# Patient Record
Sex: FEMALE | Race: WHITE | NOT HISPANIC OR LATINO | Employment: OTHER | URBAN - NONMETROPOLITAN AREA
[De-identification: names, ages, dates, MRNs, and addresses within clinical notes are randomized per-mention and may not be internally consistent; named-entity substitution may affect disease eponyms.]

---

## 2017-03-21 ENCOUNTER — OFFICE VISIT (OUTPATIENT)
Dept: ENDOCRINOLOGY | Facility: CLINIC | Age: 77
End: 2017-03-21

## 2017-03-21 VITALS
BODY MASS INDEX: 32.62 KG/M2 | HEIGHT: 67 IN | HEART RATE: 67 BPM | WEIGHT: 207.8 LBS | DIASTOLIC BLOOD PRESSURE: 68 MMHG | SYSTOLIC BLOOD PRESSURE: 120 MMHG

## 2017-03-21 DIAGNOSIS — E55.9 VITAMIN D DEFICIENCY: ICD-10-CM

## 2017-03-21 DIAGNOSIS — E53.8 B12 DEFICIENCY: ICD-10-CM

## 2017-03-21 DIAGNOSIS — E78.2 MIXED DIABETIC HYPERLIPIDEMIA ASSOCIATED WITH TYPE 2 DIABETES MELLITUS (HCC): ICD-10-CM

## 2017-03-21 DIAGNOSIS — E11.59 HYPERTENSION ASSOCIATED WITH DIABETES (HCC): ICD-10-CM

## 2017-03-21 DIAGNOSIS — E11.69 MIXED DIABETIC HYPERLIPIDEMIA ASSOCIATED WITH TYPE 2 DIABETES MELLITUS (HCC): ICD-10-CM

## 2017-03-21 DIAGNOSIS — I15.2 HYPERTENSION ASSOCIATED WITH DIABETES (HCC): ICD-10-CM

## 2017-03-21 DIAGNOSIS — Z79.4 TYPE 2 DIABETES MELLITUS WITH DIABETIC POLYNEUROPATHY, WITH LONG-TERM CURRENT USE OF INSULIN (HCC): Primary | ICD-10-CM

## 2017-03-21 DIAGNOSIS — E11.42 TYPE 2 DIABETES MELLITUS WITH DIABETIC POLYNEUROPATHY, WITH LONG-TERM CURRENT USE OF INSULIN (HCC): Primary | ICD-10-CM

## 2017-03-21 LAB — GLUCOSE BLDC GLUCOMTR-MCNC: 110 MG/DL (ref 70–130)

## 2017-03-21 PROCEDURE — 99204 OFFICE O/P NEW MOD 45 MIN: CPT | Performed by: INTERNAL MEDICINE

## 2017-03-21 PROCEDURE — 82962 GLUCOSE BLOOD TEST: CPT | Performed by: INTERNAL MEDICINE

## 2017-03-21 RX ORDER — MONTELUKAST SODIUM 10 MG/1
10 TABLET ORAL NIGHTLY
COMMUNITY

## 2017-03-21 RX ORDER — SIMVASTATIN 20 MG
20 TABLET ORAL NIGHTLY
COMMUNITY

## 2017-03-21 RX ORDER — LISINOPRIL 20 MG/1
20 TABLET ORAL DAILY
COMMUNITY

## 2017-03-21 RX ORDER — CHOLECALCIFEROL (VITAMIN D3) 125 MCG
5 CAPSULE ORAL
Status: ON HOLD | COMMUNITY
End: 2018-10-08

## 2017-03-21 RX ORDER — LISINOPRIL 10 MG/1
10 TABLET ORAL DAILY
COMMUNITY
End: 2018-10-10 | Stop reason: HOSPADM

## 2017-03-21 RX ORDER — AMLODIPINE BESYLATE 5 MG/1
5 TABLET ORAL DAILY
COMMUNITY

## 2017-03-21 RX ORDER — MEMANTINE HYDROCHLORIDE 10 MG/1
10 TABLET ORAL 2 TIMES DAILY
COMMUNITY

## 2017-03-21 NOTE — PROGRESS NOTES
Kym Fraser is a 76 y.o. female who presents for  evaluation of   Chief Complaint   Patient presents with   • Diabetes       Referring provider    Primary Care Provider    Brendan Longoria MD    Nursing Home Resident  Comes for diaabetes management     Diabetes     Duration 10 years    Timing - Diabetes is Constant    Quality -  needs improvement    Severity -  severe    Complications - peripheral neuropathy and cerebrovascular disease    Current symptoms/problems  paresthesia of the feet, polydipsia and polyuria     Alleviating Factors: nursing home provides insulin regimen    Aggravating Factors : high carb intake    Side Effects  none    Current diet  high carb    Current exercise none    Current monitoring regimen: home blood tests - 4 times daily  Home blood sugar records: fasting range: , postprandial range: 100-400    Hypoglycemia nocturnal    No past medical history on file.  No family history on file.  Social History   Substance Use Topics   • Smoking status: Unknown If Ever Smoked   • Smokeless tobacco: Not on file   • Alcohol use Not on file         Current Outpatient Prescriptions:   •  amLODIPine (NORVASC) 5 MG tablet, Take 5 mg by mouth Daily., Disp: , Rfl:   •  lisinopril (PRINIVIL,ZESTRIL) 10 MG tablet, Take 10 mg by mouth Daily., Disp: , Rfl:   •  lisinopril (PRINIVIL,ZESTRIL) 20 MG tablet, Take 20 mg by mouth Daily., Disp: , Rfl:   •  melatonin 5 MG tablet tablet, Take 5 mg by mouth., Disp: , Rfl:   •  memantine (NAMENDA) 10 MG tablet, Take 10 mg by mouth 2 (Two) Times a Day., Disp: , Rfl:   •  montelukast (SINGULAIR) 10 MG tablet, Take 10 mg by mouth Every Night., Disp: , Rfl:   •  simvastatin (ZOCOR) 20 MG tablet, Take 20 mg by mouth Every Night., Disp: , Rfl:     Review of Systems    Review of Systems   Constitutional: Positive for fatigue. Negative for activity change, appetite change, chills, diaphoresis, fever and unexpected weight change.   HENT: Negative for congestion, dental  "problem, drooling, ear discharge, ear pain, facial swelling, mouth sores, postnasal drip, rhinorrhea, sinus pressure, sore throat, tinnitus, trouble swallowing and voice change.    Eyes: Negative for photophobia, pain, discharge, redness, itching and visual disturbance.   Respiratory: Negative for apnea, cough, choking, chest tightness, shortness of breath, wheezing and stridor.    Cardiovascular: Negative for chest pain, palpitations and leg swelling.   Gastrointestinal: Negative for abdominal distention, abdominal pain, constipation, diarrhea, nausea and vomiting.   Endocrine: Positive for polyphagia. Negative for cold intolerance, heat intolerance, polydipsia and polyuria.   Genitourinary: Positive for frequency. Negative for decreased urine volume, difficulty urinating, dysuria, flank pain, hematuria and urgency.   Musculoskeletal: Negative for arthralgias, back pain, gait problem, joint swelling, myalgias, neck pain and neck stiffness.   Skin: Negative for color change, pallor, rash and wound.   Allergic/Immunologic: Negative for immunocompromised state.   Neurological: Negative for dizziness, tremors, seizures, syncope, facial asymmetry, speech difficulty, weakness, light-headedness, numbness and headaches.   Hematological: Negative for adenopathy.   Psychiatric/Behavioral: Negative for agitation, behavioral problems, confusion, decreased concentration, dysphoric mood, hallucinations, self-injury, sleep disturbance and suicidal ideas. The patient is not nervous/anxious and is not hyperactive.         Objective:     /68 (BP Location: Right arm, Patient Position: Sitting, Cuff Size: Adult)  Pulse 67  Ht 67\" (170.2 cm)  Wt 207 lb 12.8 oz (94.3 kg)  BMI 32.55 kg/m2    Physical Exam   Constitutional: She appears well-developed.   HENT:   Head: Normocephalic.   Right Ear: External ear normal.   Left Ear: External ear normal.   Nose: Nose normal.   Eyes: Conjunctivae and EOM are normal. No scleral icterus. "   Neck: Normal range of motion. Neck supple. No tracheal deviation present. No thyromegaly present.   Cardiovascular: Normal rate, regular rhythm, normal heart sounds and intact distal pulses.  Exam reveals no gallop and no friction rub.    No murmur heard.  Pulmonary/Chest: Effort normal and breath sounds normal. No stridor. No respiratory distress. She has no wheezes. She has no rales. She exhibits no tenderness.   Abdominal: Soft. Bowel sounds are normal. She exhibits no distension and no mass. There is no tenderness. There is no rebound and no guarding.   Musculoskeletal: Normal range of motion. She exhibits no tenderness or deformity.   Lymphadenopathy:     She has no cervical adenopathy.   Neurological: She is alert. She displays normal reflexes. She exhibits normal muscle tone. Coordination normal.   Skin: No rash noted. No erythema. No pallor.   Psychiatric: She has a normal mood and affect. Her behavior is normal. Judgment and thought content normal.       Lab Review    Results for orders placed or performed in visit on 03/21/17   POC Glucose Fingerstick   Result Value Ref Range    Glucose 110 70 - 130 mg/dL           Assessment/Plan       ICD-10-CM ICD-9-CM   1. Type 2 diabetes mellitus with diabetic polyneuropathy, with long-term current use of insulin E11.42 250.60    Z79.4 357.2     V58.67   2. Hypertension associated with diabetes E11.59 250.80    I10 401.9   3. B12 deficiency E53.8 266.2   4. Vitamin D deficiency E55.9 268.9   5. Mixed diabetic hyperlipidemia associated with type 2 diabetes mellitus E11.69 250.80    E78.2 272.2       Glycemic Management:   No results found for: HGBA1C  Lab Results   Component Value Date    GLUCOSE 211 (H) 11/06/2016    BUN 14 11/06/2016    CREATININE 0.58 11/06/2016    EGFRIFNONA 101 11/06/2016    BCR 24.1 11/06/2016    K 3.7 11/06/2016    CO2 20.0 (L) 11/06/2016    CALCIUM 9.2 11/06/2016    ALBUMIN 3.40 (L) 11/06/2016    LABIL2 1.2 11/06/2016    AST 56 (H) 11/06/2016     ALT 51 11/06/2016    ANIONGAP 15.0 (H) 11/06/2016     Lab Results   Component Value Date    WBC 4.93 11/06/2016    HGB 14.2 11/06/2016    HCT 43.4 11/06/2016    MCV 87.7 11/06/2016     11/06/2016       Decrease Lantus to 45 units twice daily     If she ever has a morning sugar less than 100 , back off by 5 units, example 40 units twice daily       If she has 3 consecutive morning sugars that exceed 180, increase by 5 units, example 50 units twice a day     ----      If possible , give meals with set amount of carbohydrates, between 45 to 60 grams    If approved by insurance give her trulicity 0.75 mg weekly ( this is equivalent to giving novolog for the carbohydrates that she eats )    In addition give sliding scale that you are already providing before meals    151-200L 2units  201-250 : 4units  251-300 : 6units  Above 300 : 8 units       ------    If insurance doesn't approve trulicity then give novolog for meals in addition to the sliding scale    Take , 12 units with meals for a full meal    Take , 6 units with meals for half a meal     Please arrange for appt in 4 to 8 weeks with Yaniv or Myself    Call us for 2 glucose readings more than 250 or a blood glucose reading less than 80             This document has been electronically signed by Bairon Muniz MD on March 21, 2017 2:58 PM        Dr. Sun        Lipid Management  No results found for: CHOL  No results found for: TRIG  No results found for: HDL  No results found for: LDLCALC  No results found for: LDL  No results found for: LDLDIRECT      On statin     Blood Pressure Management:    Vitals:    03/21/17 1416   BP: 120/68   Pulse: 67     Lab Results   Component Value Date    GLUCOSE 211 (H) 11/06/2016    CALCIUM 9.2 11/06/2016     11/06/2016    K 3.7 11/06/2016    CO2 20.0 (L) 11/06/2016     11/06/2016    BUN 14 11/06/2016    CREATININE 0.58 11/06/2016    EGFRIFNONA 101 11/06/2016    BCR 24.1 11/06/2016    ANIONGAP  15.0 (H) 11/06/2016       Controlled on lisinopril based regimen         Microvascular Complication Monitoring:      Eye Exam Evaluation , inquire     -----------    Last Microalbumin-Proteinuria Assessment    No results found for: MALBCRERATIO    No results found for: UTPCR    -----------      Neuropathy      Immunizations:      Updated at nursing home     Preventive Care:      Nonsmoker     Weight Related:   Wt Readings from Last 3 Encounters:   03/21/17 207 lb 12.8 oz (94.3 kg)     Body mass index is 32.55 kg/(m^2).        Diet interventions: moderate (500 kCal/d) deficit diet.      Bone Health    Lab Results   Component Value Date    CALCIUM 9.2 11/06/2016       Thyroid Health    No results found for: TSH              Other Diabetes Related Aspects       No results found for: EORAVYDU81        Last celiac panel     No results found for: GDPABIGA, TTRANSGLIGA, IGA, BASRT79RWOU      I reviewed and summarized records from Brendan Longoria MD from 2017 and I reviewed / ordered labs.     Orders Placed This Encounter   Procedures   • POC Glucose Fingerstick         A copy of my note was sent to Brendan Longoria MD    Please see my above opinion and suggestions.

## 2017-03-23 PROBLEM — E78.2 MIXED DIABETIC HYPERLIPIDEMIA ASSOCIATED WITH TYPE 2 DIABETES MELLITUS (HCC): Status: ACTIVE | Noted: 2017-03-23

## 2017-03-23 PROBLEM — Z79.4 TYPE 2 DIABETES MELLITUS WITH DIABETIC POLYNEUROPATHY, WITH LONG-TERM CURRENT USE OF INSULIN (HCC): Status: ACTIVE | Noted: 2017-03-23

## 2017-03-23 PROBLEM — F01.50 VASCULAR DEMENTIA WITHOUT BEHAVIORAL DISTURBANCE (HCC): Status: ACTIVE | Noted: 2017-03-23

## 2017-03-23 PROBLEM — E11.59 HYPERTENSION ASSOCIATED WITH DIABETES (HCC): Status: ACTIVE | Noted: 2017-03-23

## 2017-03-23 PROBLEM — E53.8 B12 DEFICIENCY: Status: ACTIVE | Noted: 2017-03-23

## 2017-03-23 PROBLEM — E55.9 VITAMIN D DEFICIENCY: Status: ACTIVE | Noted: 2017-03-23

## 2017-03-23 PROBLEM — E11.42 TYPE 2 DIABETES MELLITUS WITH DIABETIC POLYNEUROPATHY, WITH LONG-TERM CURRENT USE OF INSULIN (HCC): Status: ACTIVE | Noted: 2017-03-23

## 2017-03-23 PROBLEM — I15.2 HYPERTENSION ASSOCIATED WITH DIABETES (HCC): Status: ACTIVE | Noted: 2017-03-23

## 2017-03-23 PROBLEM — E11.69 MIXED DIABETIC HYPERLIPIDEMIA ASSOCIATED WITH TYPE 2 DIABETES MELLITUS (HCC): Status: ACTIVE | Noted: 2017-03-23

## 2018-01-10 ENCOUNTER — LAB REQUISITION (OUTPATIENT)
Dept: LAB | Facility: HOSPITAL | Age: 78
End: 2018-01-10

## 2018-01-10 DIAGNOSIS — E11.9 TYPE 2 DIABETES MELLITUS WITHOUT COMPLICATIONS (HCC): ICD-10-CM

## 2018-01-10 LAB
ALBUMIN SERPL-MCNC: 3.7 G/DL (ref 3.4–4.8)
ALBUMIN/GLOB SERPL: 1.3 G/DL (ref 1.1–1.8)
ALP SERPL-CCNC: 57 U/L (ref 38–126)
ALT SERPL W P-5'-P-CCNC: 20 U/L (ref 9–52)
ANION GAP SERPL CALCULATED.3IONS-SCNC: 11 MMOL/L (ref 5–15)
AST SERPL-CCNC: 15 U/L (ref 14–36)
BASOPHILS # BLD AUTO: 0.02 10*3/MM3 (ref 0–0.2)
BASOPHILS NFR BLD AUTO: 0.3 % (ref 0–2)
BILIRUB SERPL-MCNC: 0.5 MG/DL (ref 0.2–1.3)
BUN BLD-MCNC: 16 MG/DL (ref 7–21)
BUN/CREAT SERPL: 23.5 (ref 7–25)
CALCIUM SPEC-SCNC: 10.2 MG/DL (ref 8.4–10.2)
CHLORIDE SERPL-SCNC: 110 MMOL/L (ref 95–110)
CO2 SERPL-SCNC: 22 MMOL/L (ref 22–31)
CREAT BLD-MCNC: 0.68 MG/DL (ref 0.5–1)
DEPRECATED RDW RBC AUTO: 47.8 FL (ref 36.4–46.3)
EOSINOPHIL # BLD AUTO: 0.25 10*3/MM3 (ref 0–0.7)
EOSINOPHIL NFR BLD AUTO: 3.5 % (ref 0–7)
ERYTHROCYTE [DISTWIDTH] IN BLOOD BY AUTOMATED COUNT: 14.6 % (ref 11.5–14.5)
GFR SERPL CREATININE-BSD FRML MDRD: 84 ML/MIN/1.73 (ref 39–90)
GLOBULIN UR ELPH-MCNC: 2.9 GM/DL (ref 2.3–3.5)
GLUCOSE BLD-MCNC: 61 MG/DL (ref 60–100)
HBA1C MFR BLD: 7.7 % (ref 4–5.6)
HCT VFR BLD AUTO: 43.3 % (ref 35–45)
HGB BLD-MCNC: 14.1 G/DL (ref 12–15.5)
IMM GRANULOCYTES # BLD: 0.03 10*3/MM3 (ref 0–0.02)
IMM GRANULOCYTES NFR BLD: 0.4 % (ref 0–0.5)
LYMPHOCYTES # BLD AUTO: 2.4 10*3/MM3 (ref 0.6–4.2)
LYMPHOCYTES NFR BLD AUTO: 33.6 % (ref 10–50)
MCH RBC QN AUTO: 29.3 PG (ref 26.5–34)
MCHC RBC AUTO-ENTMCNC: 32.6 G/DL (ref 31.4–36)
MCV RBC AUTO: 89.8 FL (ref 80–98)
MONOCYTES # BLD AUTO: 0.48 10*3/MM3 (ref 0–0.9)
MONOCYTES NFR BLD AUTO: 6.7 % (ref 0–12)
NEUTROPHILS # BLD AUTO: 3.97 10*3/MM3 (ref 2–8.6)
NEUTROPHILS NFR BLD AUTO: 55.5 % (ref 37–80)
PLATELET # BLD AUTO: 211 10*3/MM3 (ref 150–450)
PMV BLD AUTO: 11 FL (ref 8–12)
POTASSIUM BLD-SCNC: 3.5 MMOL/L (ref 3.5–5.1)
PROT SERPL-MCNC: 6.6 G/DL (ref 6.3–8.6)
RBC # BLD AUTO: 4.82 10*6/MM3 (ref 3.77–5.16)
SODIUM BLD-SCNC: 143 MMOL/L (ref 137–145)
WBC NRBC COR # BLD: 7.15 10*3/MM3 (ref 3.2–9.8)

## 2018-01-10 PROCEDURE — 83036 HEMOGLOBIN GLYCOSYLATED A1C: CPT | Performed by: FAMILY MEDICINE

## 2018-01-10 PROCEDURE — 80053 COMPREHEN METABOLIC PANEL: CPT | Performed by: FAMILY MEDICINE

## 2018-01-10 PROCEDURE — 85025 COMPLETE CBC W/AUTO DIFF WBC: CPT | Performed by: FAMILY MEDICINE

## 2018-02-09 ENCOUNTER — LAB REQUISITION (OUTPATIENT)
Dept: LAB | Facility: HOSPITAL | Age: 78
End: 2018-02-09

## 2018-02-09 DIAGNOSIS — R41.9 UNSPECIFIED SYMPTOMS AND SIGNS INVOLVING COGNITIVE FUNCTIONS AND AWARENESS: ICD-10-CM

## 2018-02-09 LAB
BACTERIA UR QL AUTO: ABNORMAL /HPF
BILIRUB UR QL STRIP: NEGATIVE
CLARITY UR: ABNORMAL
COLOR UR: YELLOW
DEPRECATED RDW RBC AUTO: 47 FL (ref 36.4–46.3)
ERYTHROCYTE [DISTWIDTH] IN BLOOD BY AUTOMATED COUNT: 14.5 % (ref 11.5–14.5)
GLUCOSE UR STRIP-MCNC: NEGATIVE MG/DL
HCT VFR BLD AUTO: 42 % (ref 35–45)
HGB BLD-MCNC: 13.5 G/DL (ref 12–15.5)
HGB UR QL STRIP.AUTO: NEGATIVE
HYALINE CASTS UR QL AUTO: ABNORMAL /LPF
KETONES UR QL STRIP: NEGATIVE
LEUKOCYTE ESTERASE UR QL STRIP.AUTO: ABNORMAL
MCH RBC QN AUTO: 28.9 PG (ref 26.5–34)
MCHC RBC AUTO-ENTMCNC: 32.1 G/DL (ref 31.4–36)
MCV RBC AUTO: 89.9 FL (ref 80–98)
NITRITE UR QL STRIP: NEGATIVE
PH UR STRIP.AUTO: 7.5 [PH] (ref 5–9)
PLATELET # BLD AUTO: 246 10*3/MM3 (ref 150–450)
PMV BLD AUTO: 10.7 FL (ref 8–12)
PROT UR QL STRIP: NEGATIVE
RBC # BLD AUTO: 4.67 10*6/MM3 (ref 3.77–5.16)
RBC # UR: ABNORMAL /HPF
REF LAB TEST METHOD: ABNORMAL
SP GR UR STRIP: 1.01 (ref 1–1.03)
SQUAMOUS #/AREA URNS HPF: ABNORMAL /HPF
UROBILINOGEN UR QL STRIP: ABNORMAL
WBC NRBC COR # BLD: 8.81 10*3/MM3 (ref 3.2–9.8)
WBC UR QL AUTO: ABNORMAL /HPF

## 2018-02-09 PROCEDURE — 87086 URINE CULTURE/COLONY COUNT: CPT | Performed by: FAMILY MEDICINE

## 2018-02-09 PROCEDURE — 87086 URINE CULTURE/COLONY COUNT: CPT

## 2018-02-09 PROCEDURE — 81001 URINALYSIS AUTO W/SCOPE: CPT

## 2018-02-09 PROCEDURE — 81001 URINALYSIS AUTO W/SCOPE: CPT | Performed by: FAMILY MEDICINE

## 2018-02-09 PROCEDURE — 85027 COMPLETE CBC AUTOMATED: CPT | Performed by: FAMILY MEDICINE

## 2018-02-09 PROCEDURE — 85027 COMPLETE CBC AUTOMATED: CPT

## 2018-02-11 LAB — BACTERIA SPEC AEROBE CULT: NORMAL

## 2018-10-02 ENCOUNTER — APPOINTMENT (OUTPATIENT)
Dept: CT IMAGING | Facility: HOSPITAL | Age: 78
End: 2018-10-02

## 2018-10-02 ENCOUNTER — HOSPITAL ENCOUNTER (EMERGENCY)
Facility: HOSPITAL | Age: 78
Discharge: NURSING, REHAB OR LONG-TERM CARE | End: 2018-10-02
Attending: FAMILY MEDICINE | Admitting: FAMILY MEDICINE

## 2018-10-02 VITALS
WEIGHT: 148 LBS | HEIGHT: 67 IN | OXYGEN SATURATION: 96 % | DIASTOLIC BLOOD PRESSURE: 58 MMHG | BODY MASS INDEX: 23.23 KG/M2 | TEMPERATURE: 97.9 F | SYSTOLIC BLOOD PRESSURE: 127 MMHG | RESPIRATION RATE: 18 BRPM | HEART RATE: 72 BPM

## 2018-10-02 DIAGNOSIS — R31.9 URINARY TRACT INFECTION WITH HEMATURIA, SITE UNSPECIFIED: Primary | ICD-10-CM

## 2018-10-02 DIAGNOSIS — R10.9 ABDOMINAL PAIN, UNSPECIFIED ABDOMINAL LOCATION: ICD-10-CM

## 2018-10-02 DIAGNOSIS — N39.0 URINARY TRACT INFECTION WITH HEMATURIA, SITE UNSPECIFIED: Primary | ICD-10-CM

## 2018-10-02 LAB
ALBUMIN SERPL-MCNC: 4.1 G/DL (ref 3.4–4.8)
ALBUMIN/GLOB SERPL: 1.2 G/DL (ref 1.1–1.8)
ALP SERPL-CCNC: 73 U/L (ref 38–126)
ALT SERPL W P-5'-P-CCNC: 35 U/L (ref 9–52)
ANION GAP SERPL CALCULATED.3IONS-SCNC: 15 MMOL/L (ref 5–15)
AST SERPL-CCNC: 28 U/L (ref 14–36)
BACTERIA UR QL AUTO: ABNORMAL /HPF
BASOPHILS # BLD AUTO: 0.01 10*3/MM3 (ref 0–0.2)
BASOPHILS NFR BLD AUTO: 0.1 % (ref 0–2)
BILIRUB SERPL-MCNC: 0.9 MG/DL (ref 0.2–1.3)
BILIRUB UR QL STRIP: NEGATIVE
BUN BLD-MCNC: 18 MG/DL (ref 7–21)
BUN/CREAT SERPL: 27.7 (ref 7–25)
CALCIUM SPEC-SCNC: 10.2 MG/DL (ref 8.4–10.2)
CHLORIDE SERPL-SCNC: 104 MMOL/L (ref 95–110)
CK SERPL-CCNC: 32 U/L (ref 30–135)
CLARITY UR: ABNORMAL
CO2 SERPL-SCNC: 21 MMOL/L (ref 22–31)
COLOR UR: YELLOW
CREAT BLD-MCNC: 0.65 MG/DL (ref 0.5–1)
DEPRECATED RDW RBC AUTO: 45 FL (ref 36.4–46.3)
EOSINOPHIL # BLD AUTO: 0.06 10*3/MM3 (ref 0–0.7)
EOSINOPHIL NFR BLD AUTO: 0.8 % (ref 0–7)
ERYTHROCYTE [DISTWIDTH] IN BLOOD BY AUTOMATED COUNT: 14 % (ref 11.5–14.5)
GFR SERPL CREATININE-BSD FRML MDRD: 88 ML/MIN/1.73 (ref 39–90)
GLOBULIN UR ELPH-MCNC: 3.4 GM/DL (ref 2.3–3.5)
GLUCOSE BLD-MCNC: 123 MG/DL (ref 60–100)
GLUCOSE UR STRIP-MCNC: NEGATIVE MG/DL
HCT VFR BLD AUTO: 42 % (ref 35–45)
HGB BLD-MCNC: 14.4 G/DL (ref 12–15.5)
HGB UR QL STRIP.AUTO: ABNORMAL
HOLD SPECIMEN: NORMAL
HYALINE CASTS UR QL AUTO: ABNORMAL /LPF
IMM GRANULOCYTES # BLD: 0.01 10*3/MM3 (ref 0–0.02)
IMM GRANULOCYTES NFR BLD: 0.1 % (ref 0–0.5)
KETONES UR QL STRIP: NEGATIVE
LEUKOCYTE ESTERASE UR QL STRIP.AUTO: ABNORMAL
LYMPHOCYTES # BLD AUTO: 1.31 10*3/MM3 (ref 0.6–4.2)
LYMPHOCYTES NFR BLD AUTO: 17.1 % (ref 10–50)
MAGNESIUM SERPL-MCNC: 1.8 MG/DL (ref 1.6–2.3)
MCH RBC QN AUTO: 30.3 PG (ref 26.5–34)
MCHC RBC AUTO-ENTMCNC: 34.3 G/DL (ref 31.4–36)
MCV RBC AUTO: 88.2 FL (ref 80–98)
MONOCYTES # BLD AUTO: 0.33 10*3/MM3 (ref 0–0.9)
MONOCYTES NFR BLD AUTO: 4.3 % (ref 0–12)
NEUTROPHILS # BLD AUTO: 5.93 10*3/MM3 (ref 2–8.6)
NEUTROPHILS NFR BLD AUTO: 77.6 % (ref 37–80)
NITRITE UR QL STRIP: NEGATIVE
PH UR STRIP.AUTO: 7.5 [PH] (ref 5–9)
PLATELET # BLD AUTO: 240 10*3/MM3 (ref 150–450)
PMV BLD AUTO: 9.8 FL (ref 8–12)
POTASSIUM BLD-SCNC: 3.6 MMOL/L (ref 3.5–5.1)
PROT SERPL-MCNC: 7.5 G/DL (ref 6.3–8.6)
PROT UR QL STRIP: NEGATIVE
RBC # BLD AUTO: 4.76 10*6/MM3 (ref 3.77–5.16)
RBC # UR: ABNORMAL /HPF
REF LAB TEST METHOD: ABNORMAL
SODIUM BLD-SCNC: 140 MMOL/L (ref 137–145)
SP GR UR STRIP: 1 (ref 1–1.03)
SQUAMOUS #/AREA URNS HPF: ABNORMAL /HPF
UROBILINOGEN UR QL STRIP: ABNORMAL
WBC NRBC COR # BLD: 7.65 10*3/MM3 (ref 3.2–9.8)
WBC UR QL AUTO: ABNORMAL /HPF
WHOLE BLOOD HOLD SPECIMEN: NORMAL

## 2018-10-02 PROCEDURE — 87077 CULTURE AEROBIC IDENTIFY: CPT | Performed by: FAMILY MEDICINE

## 2018-10-02 PROCEDURE — 25010000002 ONDANSETRON PER 1 MG: Performed by: FAMILY MEDICINE

## 2018-10-02 PROCEDURE — 83735 ASSAY OF MAGNESIUM: CPT | Performed by: FAMILY MEDICINE

## 2018-10-02 PROCEDURE — 25010000002 CEFTRIAXONE PER 250 MG: Performed by: FAMILY MEDICINE

## 2018-10-02 PROCEDURE — 99284 EMERGENCY DEPT VISIT MOD MDM: CPT

## 2018-10-02 PROCEDURE — 82550 ASSAY OF CK (CPK): CPT | Performed by: FAMILY MEDICINE

## 2018-10-02 PROCEDURE — 96375 TX/PRO/DX INJ NEW DRUG ADDON: CPT

## 2018-10-02 PROCEDURE — 87086 URINE CULTURE/COLONY COUNT: CPT | Performed by: FAMILY MEDICINE

## 2018-10-02 PROCEDURE — 85025 COMPLETE CBC W/AUTO DIFF WBC: CPT | Performed by: FAMILY MEDICINE

## 2018-10-02 PROCEDURE — 96361 HYDRATE IV INFUSION ADD-ON: CPT

## 2018-10-02 PROCEDURE — 0 DIATRIZOATE MEGLUMINE & SODIUM PER 1 ML: Performed by: FAMILY MEDICINE

## 2018-10-02 PROCEDURE — 36415 COLL VENOUS BLD VENIPUNCTURE: CPT

## 2018-10-02 PROCEDURE — 25010000002 IOPAMIDOL 61 % SOLUTION: Performed by: FAMILY MEDICINE

## 2018-10-02 PROCEDURE — 96365 THER/PROPH/DIAG IV INF INIT: CPT

## 2018-10-02 PROCEDURE — 87186 SC STD MICRODIL/AGAR DIL: CPT | Performed by: FAMILY MEDICINE

## 2018-10-02 PROCEDURE — P9612 CATHETERIZE FOR URINE SPEC: HCPCS

## 2018-10-02 PROCEDURE — 81001 URINALYSIS AUTO W/SCOPE: CPT | Performed by: FAMILY MEDICINE

## 2018-10-02 PROCEDURE — 80053 COMPREHEN METABOLIC PANEL: CPT | Performed by: FAMILY MEDICINE

## 2018-10-02 PROCEDURE — 25010000002 MORPHINE PER 10 MG: Performed by: FAMILY MEDICINE

## 2018-10-02 PROCEDURE — 74177 CT ABD & PELVIS W/CONTRAST: CPT

## 2018-10-02 RX ORDER — BRIMONIDINE TARTRATE AND TIMOLOL MALEATE 2; 5 MG/ML; MG/ML
SOLUTION OPHTHALMIC EVERY 12 HOURS
COMMUNITY

## 2018-10-02 RX ORDER — DONEPEZIL HYDROCHLORIDE 10 MG/1
10 TABLET, FILM COATED ORAL NIGHTLY
COMMUNITY

## 2018-10-02 RX ORDER — CHLORAL HYDRATE 500 MG
CAPSULE ORAL
COMMUNITY

## 2018-10-02 RX ORDER — ONDANSETRON 4 MG/1
4 TABLET, ORALLY DISINTEGRATING ORAL EVERY 6 HOURS PRN
Qty: 10 TABLET | Refills: 0 | Status: SHIPPED | OUTPATIENT
Start: 2018-10-02

## 2018-10-02 RX ORDER — FLUOCINOLONE ACETONIDE 0.1 MG/ML
SOLUTION TOPICAL 2 TIMES DAILY
Status: ON HOLD | COMMUNITY
End: 2018-10-08

## 2018-10-02 RX ORDER — FAMOTIDINE 20 MG/1
20 TABLET, FILM COATED ORAL 2 TIMES DAILY
COMMUNITY

## 2018-10-02 RX ORDER — FLUOXETINE HYDROCHLORIDE 20 MG/1
20 CAPSULE ORAL DAILY
COMMUNITY

## 2018-10-02 RX ORDER — ASPIRIN 81 MG/1
81 TABLET, CHEWABLE ORAL DAILY
COMMUNITY

## 2018-10-02 RX ORDER — MIRTAZAPINE 15 MG/1
15 TABLET, FILM COATED ORAL NIGHTLY
COMMUNITY

## 2018-10-02 RX ORDER — CEPHALEXIN 500 MG/1
500 CAPSULE ORAL 4 TIMES DAILY
Qty: 28 CAPSULE | Refills: 0 | Status: SHIPPED | OUTPATIENT
Start: 2018-10-02 | End: 2018-10-10 | Stop reason: HOSPADM

## 2018-10-02 RX ORDER — FAMOTIDINE 10 MG/ML
20 INJECTION, SOLUTION INTRAVENOUS ONCE
Status: COMPLETED | OUTPATIENT
Start: 2018-10-02 | End: 2018-10-02

## 2018-10-02 RX ORDER — ONDANSETRON 2 MG/ML
4 INJECTION INTRAMUSCULAR; INTRAVENOUS ONCE
Status: COMPLETED | OUTPATIENT
Start: 2018-10-02 | End: 2018-10-02

## 2018-10-02 RX ADMIN — SODIUM CHLORIDE 1000 ML: 9 INJECTION, SOLUTION INTRAVENOUS at 09:45

## 2018-10-02 RX ADMIN — IOPAMIDOL 80 ML: 612 INJECTION, SOLUTION INTRAVENOUS at 11:52

## 2018-10-02 RX ADMIN — MORPHINE SULFATE 4 MG: 4 INJECTION, SOLUTION INTRAMUSCULAR; INTRAVENOUS at 09:48

## 2018-10-02 RX ADMIN — DIATRIZOATE MEGLUMINE AND DIATRIZOATE SODIUM 30 ML: 660; 100 LIQUID ORAL; RECTAL at 11:52

## 2018-10-02 RX ADMIN — ONDANSETRON 4 MG: 2 INJECTION, SOLUTION INTRAMUSCULAR; INTRAVENOUS at 09:46

## 2018-10-02 RX ADMIN — CEFTRIAXONE SODIUM 1 G: 1 INJECTION, POWDER, FOR SOLUTION INTRAMUSCULAR; INTRAVENOUS at 12:46

## 2018-10-02 RX ADMIN — FAMOTIDINE 20 MG: 10 INJECTION, SOLUTION INTRAVENOUS at 09:52

## 2018-10-02 NOTE — ED NOTES
"Daughter states that her mother \"just isn't acting right\" today and is complaining of feeling terrible and does not normally complain.  States her mother sometimes uses walker but has not been getting up at all the past couple of days.  States pt's baseline mental status is confused but normally oriented to person.       Ligia Alicea RN  10/02/18 0906    "

## 2018-10-02 NOTE — ED NOTES
Pt waiting on ambulance ride back to nursing home.  Denies needs at this time.       Ligia Alicea, RN  10/02/18 9063

## 2018-10-02 NOTE — ED NOTES
Pt's daughter, Lennie here.  Daughter states she is pt's POA & although they do not have paperwork on it, states her and her mother's wishes are to be DNR status including DNI.  Informed daughter to contact [facility] to get necessary paperwork but that while in hospital needed admitting Dr order for DNR.       Ligia Alicea, RN  10/02/18 0929

## 2018-10-02 NOTE — ED NOTES
EMS here to get pt.  Daughter informed, paperwork to paramedic, pt in NAD.       Ligia Alicea, RN  10/02/18 5225

## 2018-10-02 NOTE — ED NOTES
Took patient to restroom to attempt to obtain urine sample.  Pt unable to urinate.  Informed JUSTIN Strong.     Ines Toth N  10/02/18 0916

## 2018-10-02 NOTE — ED PROVIDER NOTES
Subjective     Altered Mental Status   Presenting symptoms: lethargy    Presenting symptoms: no confusion    Severity:  Mild  Most recent episode:  Yesterday  Episode history:  Single  Duration:  2 days  Progression:  Waxing and waning  Chronicity:  Recurrent  Context: nursing home resident    Associated symptoms: abdominal pain, decreased appetite, headaches, nausea, vomiting and weakness    Associated symptoms: normal movement, no agitation, no bladder incontinence, no depression, no difficulty breathing, no eye deviation, no fever, no hallucinations, no light-headedness, no rash, no seizures, no slurred speech and no suicidal behavior    Nausea   The primary symptoms include abdominal pain, nausea, vomiting and diarrhea. Primary symptoms do not include fever, fatigue, dysuria, myalgias or rash.   The illness does not include chills.       Review of Systems   Constitutional: Positive for activity change, appetite change and decreased appetite. Negative for chills, diaphoresis, fatigue and fever.   HENT: Positive for sore throat. Negative for congestion, ear discharge, ear pain, nosebleeds, rhinorrhea, sinus pressure and trouble swallowing.    Eyes: Negative for discharge and redness.   Respiratory: Negative for apnea, cough, chest tightness, shortness of breath and wheezing.    Cardiovascular: Negative for chest pain.   Gastrointestinal: Positive for abdominal pain, diarrhea, nausea and vomiting.   Endocrine: Negative for polyuria.   Genitourinary: Negative for bladder incontinence, dysuria, frequency and urgency.   Musculoskeletal: Negative for myalgias and neck pain.   Skin: Negative for color change and rash.   Allergic/Immunologic: Negative for immunocompromised state.   Neurological: Positive for weakness and headaches. Negative for dizziness, seizures, syncope and light-headedness.   Hematological: Negative for adenopathy. Does not bruise/bleed easily.   Psychiatric/Behavioral: Negative for agitation,  behavioral problems, confusion and hallucinations.   All other systems reviewed and are negative.      Past Medical History:   Diagnosis Date   • Anxiety    • Asthma    • Dementia    • Depression    • Diabetes mellitus (CMS/HCC)    • GERD (gastroesophageal reflux disease)    • Hemorrhoids    • Hypertension    • Nervous breakdown    • Nervous breakdown        No Known Allergies    Past Surgical History:   Procedure Laterality Date   • EYE SURGERY      cataract   • HYSTERECTOMY     • MASTECTOMY      partial (pt has both breasts)   • POLYPECTOMY         History reviewed. No pertinent family history.    Social History     Social History   • Marital status:      Social History Main Topics   • Smoking status: Never Smoker   • Alcohol use No   • Drug use: No   • Sexual activity: Defer     Other Topics Concern   • Not on file           Objective   Physical Exam   Constitutional: She is oriented to person, place, and time. She appears well-developed and well-nourished.   HENT:   Head: Normocephalic and atraumatic.   Nose: Nose normal.   Mouth/Throat: Oropharynx is clear and moist.   Eyes: Pupils are equal, round, and reactive to light. Conjunctivae and EOM are normal. Right eye exhibits no discharge. Left eye exhibits no discharge. No scleral icterus.   Neck: Normal range of motion. Neck supple. No tracheal deviation present.   Cardiovascular: Normal rate, regular rhythm and normal heart sounds.    No murmur heard.  Pulmonary/Chest: Effort normal and breath sounds normal. No stridor. No respiratory distress. She has no wheezes. She has no rales.   Abdominal: Soft. Bowel sounds are normal. She exhibits no distension and no mass. There is tenderness in the left upper quadrant. There is no rebound and no guarding.   Musculoskeletal: She exhibits no edema.   Neurological: She is alert and oriented to person, place, and time. Coordination normal.   Skin: Skin is warm and dry. No rash noted. No erythema.   Psychiatric: She  has a normal mood and affect. Her behavior is normal. Thought content normal.   Nursing note and vitals reviewed.      Procedures           ED Course          Labs Reviewed   COMPREHENSIVE METABOLIC PANEL - Abnormal; Notable for the following:        Result Value    Glucose 123 (*)     CO2 21.0 (*)     BUN/Creatinine Ratio 27.7 (*)     All other components within normal limits    Narrative:     The MDRD GFR formula is only valid for adults with stable renal function between ages 18 and 70.   URINALYSIS W/ CULTURE IF INDICATED - Abnormal; Notable for the following:     Appearance, UA Cloudy (*)     Blood, UA Small (1+) (*)     Leuk Esterase, UA Large (3+) (*)     All other components within normal limits   URINALYSIS, MICROSCOPIC ONLY - Abnormal; Notable for the following:     WBC, UA Too Numerous to Count (*)     Bacteria, UA 1+ (*)     All other components within normal limits   MAGNESIUM - Normal   CK - Normal   CBC WITH AUTO DIFFERENTIAL - Normal   URINE CULTURE   CBC AND DIFFERENTIAL    Narrative:     The following orders were created for panel order CBC & Differential.  Procedure                               Abnormality         Status                     ---------                               -----------         ------                     CBC Auto Differential[[id number]]         Normal              Final result                 Please view results for these tests on the individual orders.   EXTRA TUBES    Narrative:     The following orders were created for panel order Extra Tubes.  Procedure                               Abnormality         Status                     ---------                               -----------         ------                     Light Blue Top[[id number]]                                    Final result               Gold Top - [facility][[id number]]                                    Final result                 Please view results for these tests on the individual orders.   LIGHT BLUE TOP   GOLD  TOP - SST       CT Abdomen Pelvis With Contrast   Final Result   The gallbladder is mildly distended measuring 10.7 cm in length.   No calcified stones or pericholecystic inflammatory change. This   finding should be correlated to the clinical exam and laboratory   data.      Small hiatal hernia.      Moderate to large fat-containing midline abdominal pelvic wall   hernia.      Mild uniform urinary bladder wall thickening, nonspecific,   however may represent sequela of cystitis.      Demineralization and degenerative changes within the lumbar   spine.      Electronically signed by:  Brendan Bell MD  10/2/2018 12:14 PM   CDT Workstation: [id number]                    [facility]      Final diagnoses:   Urinary tract infection with hematuria, site unspecified   Abdominal pain, unspecified abdominal location            Mickey Almendarez MD  10/02/18 0079

## 2018-10-04 LAB — BACTERIA SPEC AEROBE CULT: ABNORMAL

## 2018-10-08 ENCOUNTER — APPOINTMENT (OUTPATIENT)
Dept: MRI IMAGING | Facility: HOSPITAL | Age: 78
End: 2018-10-08

## 2018-10-08 ENCOUNTER — APPOINTMENT (OUTPATIENT)
Dept: CT IMAGING | Facility: HOSPITAL | Age: 78
End: 2018-10-08

## 2018-10-08 ENCOUNTER — HOSPITAL ENCOUNTER (OUTPATIENT)
Facility: HOSPITAL | Age: 78
Setting detail: OBSERVATION
Discharge: NURSING, REHAB OR LONG-TERM CARE | End: 2018-10-10
Attending: EMERGENCY MEDICINE | Admitting: EMERGENCY MEDICINE

## 2018-10-08 ENCOUNTER — APPOINTMENT (OUTPATIENT)
Dept: GENERAL RADIOLOGY | Facility: HOSPITAL | Age: 78
End: 2018-10-08

## 2018-10-08 DIAGNOSIS — Z78.9 IMPAIRED MOBILITY AND ADLS: ICD-10-CM

## 2018-10-08 DIAGNOSIS — Z74.09 IMPAIRED FUNCTIONAL MOBILITY, BALANCE, GAIT, AND ENDURANCE: ICD-10-CM

## 2018-10-08 DIAGNOSIS — R62.7 FTT (FAILURE TO THRIVE) IN ADULT: ICD-10-CM

## 2018-10-08 DIAGNOSIS — R63.4 WEIGHT LOSS: ICD-10-CM

## 2018-10-08 DIAGNOSIS — Z74.09 IMPAIRED MOBILITY AND ADLS: ICD-10-CM

## 2018-10-08 DIAGNOSIS — R41.82 ALTERED MENTAL STATUS, UNSPECIFIED ALTERED MENTAL STATUS TYPE: Primary | ICD-10-CM

## 2018-10-08 PROBLEM — N39.0 UTI (URINARY TRACT INFECTION): Status: ACTIVE | Noted: 2018-10-08

## 2018-10-08 LAB
ALBUMIN SERPL-MCNC: 3.6 G/DL (ref 3.4–4.8)
ALBUMIN/GLOB SERPL: 1.2 G/DL (ref 1.1–1.8)
ALP SERPL-CCNC: 69 U/L (ref 38–126)
ALT SERPL W P-5'-P-CCNC: 38 U/L (ref 9–52)
ANION GAP SERPL CALCULATED.3IONS-SCNC: 13 MMOL/L (ref 5–15)
AST SERPL-CCNC: 32 U/L (ref 14–36)
BACTERIA UR QL AUTO: ABNORMAL /HPF
BASOPHILS # BLD AUTO: 0.01 10*3/MM3 (ref 0–0.2)
BASOPHILS NFR BLD AUTO: 0.1 % (ref 0–2)
BILIRUB SERPL-MCNC: 0.5 MG/DL (ref 0.2–1.3)
BILIRUB UR QL STRIP: NEGATIVE
BUN BLD-MCNC: 15 MG/DL (ref 7–21)
BUN/CREAT SERPL: 22.7 (ref 7–25)
CALCIUM SPEC-SCNC: 9.5 MG/DL (ref 8.4–10.2)
CHLORIDE SERPL-SCNC: 104 MMOL/L (ref 95–110)
CK SERPL-CCNC: 99 U/L (ref 30–135)
CLARITY UR: CLEAR
CO2 SERPL-SCNC: 24 MMOL/L (ref 22–31)
COLOR UR: YELLOW
CRE SCREEN PCR: NOT DETECTED
CREAT BLD-MCNC: 0.66 MG/DL (ref 0.5–1)
DEPRECATED RDW RBC AUTO: 47.7 FL (ref 36.4–46.3)
EOSINOPHIL # BLD AUTO: 0.05 10*3/MM3 (ref 0–0.7)
EOSINOPHIL NFR BLD AUTO: 0.5 % (ref 0–7)
ERYTHROCYTE [DISTWIDTH] IN BLOOD BY AUTOMATED COUNT: 14.7 % (ref 11.5–14.5)
GFR SERPL CREATININE-BSD FRML MDRD: 87 ML/MIN/1.73 (ref 39–90)
GLOBULIN UR ELPH-MCNC: 3 GM/DL (ref 2.3–3.5)
GLUCOSE BLD-MCNC: 98 MG/DL (ref 60–100)
GLUCOSE BLDC GLUCOMTR-MCNC: 134 MG/DL (ref 70–130)
GLUCOSE BLDC GLUCOMTR-MCNC: 96 MG/DL (ref 70–130)
GLUCOSE UR STRIP-MCNC: NEGATIVE MG/DL
HCT VFR BLD AUTO: 40.1 % (ref 35–45)
HGB BLD-MCNC: 13.6 G/DL (ref 12–15.5)
HGB UR QL STRIP.AUTO: NEGATIVE
HOLD SPECIMEN: NORMAL
HYALINE CASTS UR QL AUTO: ABNORMAL /LPF
IMM GRANULOCYTES # BLD: 0.04 10*3/MM3 (ref 0–0.02)
IMM GRANULOCYTES NFR BLD: 0.4 % (ref 0–0.5)
IMP STRAIN: NOT DETECTED
KETONES UR QL STRIP: NEGATIVE
KPC STRAIN: NOT DETECTED
LEUKOCYTE ESTERASE UR QL STRIP.AUTO: ABNORMAL
LYMPHOCYTES # BLD AUTO: 1.87 10*3/MM3 (ref 0.6–4.2)
LYMPHOCYTES NFR BLD AUTO: 18 % (ref 10–50)
MAGNESIUM SERPL-MCNC: 1.9 MG/DL (ref 1.6–2.3)
MCH RBC QN AUTO: 30.2 PG (ref 26.5–34)
MCHC RBC AUTO-ENTMCNC: 33.9 G/DL (ref 31.4–36)
MCV RBC AUTO: 89.1 FL (ref 80–98)
MONOCYTES # BLD AUTO: 0.46 10*3/MM3 (ref 0–0.9)
MONOCYTES NFR BLD AUTO: 4.4 % (ref 0–12)
NDM STRAIN: NOT DETECTED
NEUTROPHILS # BLD AUTO: 7.98 10*3/MM3 (ref 2–8.6)
NEUTROPHILS NFR BLD AUTO: 76.6 % (ref 37–80)
NITRITE UR QL STRIP: NEGATIVE
NT-PROBNP SERPL-MCNC: 173 PG/ML (ref 0–1800)
OXA 48 STRAIN: NOT DETECTED
PH UR STRIP.AUTO: 5.5 [PH] (ref 5–9)
PLATELET # BLD AUTO: 248 10*3/MM3 (ref 150–450)
PMV BLD AUTO: 9.9 FL (ref 8–12)
POTASSIUM BLD-SCNC: 3.8 MMOL/L (ref 3.5–5.1)
PROT SERPL-MCNC: 6.6 G/DL (ref 6.3–8.6)
PROT UR QL STRIP: NEGATIVE
RBC # BLD AUTO: 4.5 10*6/MM3 (ref 3.77–5.16)
RBC # UR: ABNORMAL /HPF
REF LAB TEST METHOD: ABNORMAL
SODIUM BLD-SCNC: 141 MMOL/L (ref 137–145)
SP GR UR STRIP: 1.02 (ref 1–1.03)
SQUAMOUS #/AREA URNS HPF: ABNORMAL /HPF
TROPONIN I SERPL-MCNC: <0.012 NG/ML
TSH SERPL DL<=0.05 MIU/L-ACNC: 0.62 MIU/ML (ref 0.46–4.68)
UROBILINOGEN UR QL STRIP: ABNORMAL
VIM STRAIN: NOT DETECTED
VIT B12 BLD-MCNC: 236 PG/ML (ref 239–931)
WBC NRBC COR # BLD: 10.41 10*3/MM3 (ref 3.2–9.8)
WBC UR QL AUTO: ABNORMAL /HPF
WHOLE BLOOD HOLD SPECIMEN: NORMAL

## 2018-10-08 PROCEDURE — 82607 VITAMIN B-12: CPT | Performed by: HOSPITALIST

## 2018-10-08 PROCEDURE — 96361 HYDRATE IV INFUSION ADD-ON: CPT

## 2018-10-08 PROCEDURE — 87086 URINE CULTURE/COLONY COUNT: CPT | Performed by: EMERGENCY MEDICINE

## 2018-10-08 PROCEDURE — G0378 HOSPITAL OBSERVATION PER HR: HCPCS

## 2018-10-08 PROCEDURE — 63710000001 INSULIN DETEMIR PER 5 UNITS: Performed by: HOSPITALIST

## 2018-10-08 PROCEDURE — 84443 ASSAY THYROID STIM HORMONE: CPT | Performed by: EMERGENCY MEDICINE

## 2018-10-08 PROCEDURE — 83880 ASSAY OF NATRIURETIC PEPTIDE: CPT | Performed by: EMERGENCY MEDICINE

## 2018-10-08 PROCEDURE — 72128 CT CHEST SPINE W/O DYE: CPT

## 2018-10-08 PROCEDURE — 84484 ASSAY OF TROPONIN QUANT: CPT | Performed by: EMERGENCY MEDICINE

## 2018-10-08 PROCEDURE — 80053 COMPREHEN METABOLIC PANEL: CPT | Performed by: EMERGENCY MEDICINE

## 2018-10-08 PROCEDURE — 85025 COMPLETE CBC W/AUTO DIFF WBC: CPT | Performed by: EMERGENCY MEDICINE

## 2018-10-08 PROCEDURE — 87081 CULTURE SCREEN ONLY: CPT | Performed by: HOSPITALIST

## 2018-10-08 PROCEDURE — 82962 GLUCOSE BLOOD TEST: CPT

## 2018-10-08 PROCEDURE — 83036 HEMOGLOBIN GLYCOSYLATED A1C: CPT | Performed by: HOSPITALIST

## 2018-10-08 PROCEDURE — 71045 X-RAY EXAM CHEST 1 VIEW: CPT

## 2018-10-08 PROCEDURE — 81001 URINALYSIS AUTO W/SCOPE: CPT | Performed by: EMERGENCY MEDICINE

## 2018-10-08 PROCEDURE — 72131 CT LUMBAR SPINE W/O DYE: CPT

## 2018-10-08 PROCEDURE — 99284 EMERGENCY DEPT VISIT MOD MDM: CPT

## 2018-10-08 PROCEDURE — 83735 ASSAY OF MAGNESIUM: CPT | Performed by: EMERGENCY MEDICINE

## 2018-10-08 PROCEDURE — 25010000002 CEFTRIAXONE PER 250 MG: Performed by: HOSPITALIST

## 2018-10-08 PROCEDURE — 72125 CT NECK SPINE W/O DYE: CPT

## 2018-10-08 PROCEDURE — 84484 ASSAY OF TROPONIN QUANT: CPT | Performed by: HOSPITALIST

## 2018-10-08 PROCEDURE — 93010 ELECTROCARDIOGRAM REPORT: CPT | Performed by: INTERNAL MEDICINE

## 2018-10-08 PROCEDURE — 82550 ASSAY OF CK (CPK): CPT | Performed by: EMERGENCY MEDICINE

## 2018-10-08 PROCEDURE — 70450 CT HEAD/BRAIN W/O DYE: CPT

## 2018-10-08 PROCEDURE — 93005 ELECTROCARDIOGRAM TRACING: CPT | Performed by: EMERGENCY MEDICINE

## 2018-10-08 RX ORDER — ATORVASTATIN CALCIUM 10 MG/1
10 TABLET, FILM COATED ORAL DAILY
Status: DISCONTINUED | OUTPATIENT
Start: 2018-10-08 | End: 2018-10-10 | Stop reason: HOSPADM

## 2018-10-08 RX ORDER — SODIUM CHLORIDE 0.9 % (FLUSH) 0.9 %
3 SYRINGE (ML) INJECTION EVERY 12 HOURS SCHEDULED
Status: DISCONTINUED | OUTPATIENT
Start: 2018-10-08 | End: 2018-10-10 | Stop reason: HOSPADM

## 2018-10-08 RX ORDER — MEMANTINE HYDROCHLORIDE 5 MG/1
10 TABLET ORAL EVERY 12 HOURS SCHEDULED
Status: DISCONTINUED | OUTPATIENT
Start: 2018-10-08 | End: 2018-10-10 | Stop reason: HOSPADM

## 2018-10-08 RX ORDER — SODIUM CHLORIDE 0.9 % (FLUSH) 0.9 %
10 SYRINGE (ML) INJECTION AS NEEDED
Status: DISCONTINUED | OUTPATIENT
Start: 2018-10-08 | End: 2018-10-10 | Stop reason: HOSPADM

## 2018-10-08 RX ORDER — DORZOLAMIDE HYDROCHLORIDE AND TIMOLOL MALEATE 20; 5 MG/ML; MG/ML
1 SOLUTION/ DROPS OPHTHALMIC 2 TIMES DAILY
Status: DISCONTINUED | OUTPATIENT
Start: 2018-10-08 | End: 2018-10-10 | Stop reason: HOSPADM

## 2018-10-08 RX ORDER — SODIUM CHLORIDE 0.9 % (FLUSH) 0.9 %
3-10 SYRINGE (ML) INJECTION AS NEEDED
Status: DISCONTINUED | OUTPATIENT
Start: 2018-10-08 | End: 2018-10-10 | Stop reason: HOSPADM

## 2018-10-08 RX ORDER — AMLODIPINE BESYLATE 5 MG/1
5 TABLET ORAL DAILY
Status: DISCONTINUED | OUTPATIENT
Start: 2018-10-09 | End: 2018-10-10 | Stop reason: HOSPADM

## 2018-10-08 RX ORDER — ONDANSETRON 4 MG/1
4 TABLET, ORALLY DISINTEGRATING ORAL EVERY 6 HOURS PRN
Status: DISCONTINUED | OUTPATIENT
Start: 2018-10-08 | End: 2018-10-10 | Stop reason: HOSPADM

## 2018-10-08 RX ORDER — MONTELUKAST SODIUM 10 MG/1
10 TABLET ORAL NIGHTLY
Status: DISCONTINUED | OUTPATIENT
Start: 2018-10-09 | End: 2018-10-10 | Stop reason: HOSPADM

## 2018-10-08 RX ORDER — SODIUM CHLORIDE 9 MG/ML
100 INJECTION, SOLUTION INTRAVENOUS CONTINUOUS
Status: DISCONTINUED | OUTPATIENT
Start: 2018-10-08 | End: 2018-10-10

## 2018-10-08 RX ORDER — LISINOPRIL 20 MG/1
20 TABLET ORAL DAILY
Status: DISCONTINUED | OUTPATIENT
Start: 2018-10-08 | End: 2018-10-10 | Stop reason: HOSPADM

## 2018-10-08 RX ORDER — INSULIN GLARGINE 100 [IU]/ML
15 INJECTION, SOLUTION SUBCUTANEOUS 2 TIMES DAILY
COMMUNITY
End: 2018-10-10 | Stop reason: HOSPADM

## 2018-10-08 RX ORDER — MIRTAZAPINE 15 MG/1
15 TABLET, FILM COATED ORAL NIGHTLY
Status: DISCONTINUED | OUTPATIENT
Start: 2018-10-08 | End: 2018-10-10 | Stop reason: HOSPADM

## 2018-10-08 RX ORDER — LORAZEPAM 1 MG/1
1 TABLET ORAL EVERY 6 HOURS PRN
Status: DISCONTINUED | OUTPATIENT
Start: 2018-10-08 | End: 2018-10-10 | Stop reason: HOSPADM

## 2018-10-08 RX ORDER — ASPIRIN 81 MG/1
81 TABLET, CHEWABLE ORAL DAILY
Status: DISCONTINUED | OUTPATIENT
Start: 2018-10-08 | End: 2018-10-10 | Stop reason: HOSPADM

## 2018-10-08 RX ORDER — ONDANSETRON 4 MG/1
4 TABLET, FILM COATED ORAL EVERY 6 HOURS PRN
Status: DISCONTINUED | OUTPATIENT
Start: 2018-10-08 | End: 2018-10-10 | Stop reason: HOSPADM

## 2018-10-08 RX ORDER — FLUOXETINE HYDROCHLORIDE 20 MG/1
20 CAPSULE ORAL DAILY
Status: DISCONTINUED | OUTPATIENT
Start: 2018-10-09 | End: 2018-10-10 | Stop reason: HOSPADM

## 2018-10-08 RX ORDER — ACETAMINOPHEN 325 MG/1
650 TABLET ORAL EVERY 4 HOURS PRN
Status: DISCONTINUED | OUTPATIENT
Start: 2018-10-08 | End: 2018-10-10 | Stop reason: HOSPADM

## 2018-10-08 RX ORDER — FAMOTIDINE 20 MG/1
20 TABLET, FILM COATED ORAL 2 TIMES DAILY
Status: DISCONTINUED | OUTPATIENT
Start: 2018-10-08 | End: 2018-10-10 | Stop reason: HOSPADM

## 2018-10-08 RX ORDER — DEXTROSE MONOHYDRATE 25 G/50ML
25 INJECTION, SOLUTION INTRAVENOUS
Status: DISCONTINUED | OUTPATIENT
Start: 2018-10-08 | End: 2018-10-10 | Stop reason: HOSPADM

## 2018-10-08 RX ORDER — DONEPEZIL HYDROCHLORIDE 10 MG/1
10 TABLET, FILM COATED ORAL NIGHTLY
Status: DISCONTINUED | OUTPATIENT
Start: 2018-10-08 | End: 2018-10-10 | Stop reason: HOSPADM

## 2018-10-08 RX ORDER — NICOTINE POLACRILEX 4 MG
15 LOZENGE BUCCAL
Status: DISCONTINUED | OUTPATIENT
Start: 2018-10-08 | End: 2018-10-10 | Stop reason: HOSPADM

## 2018-10-08 RX ORDER — ONDANSETRON 2 MG/ML
4 INJECTION INTRAMUSCULAR; INTRAVENOUS EVERY 6 HOURS PRN
Status: DISCONTINUED | OUTPATIENT
Start: 2018-10-08 | End: 2018-10-10 | Stop reason: HOSPADM

## 2018-10-08 RX ORDER — LISINOPRIL 10 MG/1
10 TABLET ORAL DAILY
Status: DISCONTINUED | OUTPATIENT
Start: 2018-10-08 | End: 2018-10-08

## 2018-10-08 RX ADMIN — Medication 10 ML: at 11:50

## 2018-10-08 RX ADMIN — METFORMIN HYDROCHLORIDE 500 MG: 500 TABLET, FILM COATED ORAL at 18:23

## 2018-10-08 RX ADMIN — ATORVASTATIN CALCIUM 10 MG: 10 TABLET, FILM COATED ORAL at 20:32

## 2018-10-08 RX ADMIN — SODIUM CHLORIDE 100 ML/HR: 9 INJECTION, SOLUTION INTRAVENOUS at 17:34

## 2018-10-08 RX ADMIN — LORAZEPAM 1 MG: 1 TABLET ORAL at 16:17

## 2018-10-08 RX ADMIN — Medication 3 ML: at 20:35

## 2018-10-08 RX ADMIN — CEFTRIAXONE SODIUM 1 G: 1 INJECTION, POWDER, FOR SOLUTION INTRAMUSCULAR; INTRAVENOUS at 17:34

## 2018-10-08 RX ADMIN — DONEPEZIL HYDROCHLORIDE 10 MG: 10 TABLET ORAL at 20:32

## 2018-10-08 RX ADMIN — INSULIN DETEMIR 15 UNITS: 100 INJECTION, SOLUTION SUBCUTANEOUS at 20:32

## 2018-10-08 RX ADMIN — DORZOLAMIDE HYDROCHLORIDE AND TIMOLOL MALEATE 1 DROP: 20; 5 SOLUTION/ DROPS OPHTHALMIC at 20:32

## 2018-10-08 RX ADMIN — MEMANTINE 10 MG: 5 TABLET ORAL at 20:32

## 2018-10-08 RX ADMIN — MIRTAZAPINE 15 MG: 15 TABLET, FILM COATED ORAL at 20:32

## 2018-10-08 NOTE — ED NOTES
Code word : saray Miller Kyra - daughter: cell # [phone]     Yaniv Verduzco RN  10/08/18 8990

## 2018-10-08 NOTE — ED NOTES
Pt arrived on long back board with c collar in place. EMS states pt fell backwards out of wheelchair. Unknown if pt hit her head. EMS was unable to tell me pt's baseline orientation status. No report was given by SNF pta. Pt answers yes to pain on every area assessed. Pt cleaned up from incontinent bm & repositioned for comfort. No skin issues were seen on assessment      Yaniv Verduzco RN  10/08/18 0821

## 2018-10-08 NOTE — ED PROVIDER NOTES
Subjective   78-year-old female with history of dementia is brought to emergency department after a fall at her nursing facility.  She is reportedly pushing back in a chair and fell backwards hitting her head on the ground.  She does not take blood thinners but does take an aspirin.  She did not take aspirin this morning.  She had no loss of consciousness and this fall was witnessed.  Daughter reports the patient had a fall about a week ago that she was not made aware of until after she had had a recent visit to the emergency department and found to have urinary tract infection.  She has been taking antibiotic for UTI but daughter states her altered mental status has not improved and she is continuing to not eat.  She states she's had a 60 pound weight loss but is unsure of the exact timeframe of the weight loss.  Patient is complains of pain all over and not feeling well with no focal complaint.    Family history, surgical history, social history, current medications and allergies are reviewed with the patient and triage documentation and vitals are reviewed.        History provided by:  Medical records, EMS personnel, nursing home, relative and patient  History limited by:  Dementia   used: No        Review of Systems   Unable to perform ROS: Dementia   Constitutional: Positive for appetite change and unexpected weight change.   Respiratory: Negative for cough and shortness of breath.    Cardiovascular: Negative for chest pain and palpitations.   Musculoskeletal: Positive for back pain and neck pain.        Falls   Neurological: Negative for syncope, facial asymmetry and speech difficulty.       Past Medical History:   Diagnosis Date   • Anxiety    • Asthma    • Dementia    • Depression    • Diabetes mellitus (CMS/[facility])    • GERD (gastroesophageal reflux disease)    • Hemorrhoids    • Hypertension    • Nervous breakdown    • Nervous breakdown        No Known Allergies    Past Surgical History:    Procedure Laterality Date   • EYE SURGERY      cataract   • HYSTERECTOMY     • MASTECTOMY      partial (pt has both breasts)   • POLYPECTOMY         History reviewed. No pertinent family history.    Social History     Social History   • Marital status:      Social History Main Topics   • Smoking status: Never Smoker   • Alcohol use No   • Drug use: No   • Sexual activity: Defer     Other Topics Concern   • Not on file           Objective   Physical Exam   Constitutional: She appears well-developed and well-nourished. No distress.   HENT:   Head: Normocephalic and atraumatic.   Right Ear: External ear normal.   Left Ear: External ear normal.   Nose: Nose normal.   Mouth/Throat: Oropharynx is clear and moist.   Eyes: Pupils are equal, round, and reactive to light. Conjunctivae and EOM are normal. Right eye exhibits no discharge. Left eye exhibits no discharge.   Neck: Normal range of motion. No JVD present.   Cardiovascular: Normal rate, regular rhythm and intact distal pulses.    Pulmonary/Chest: Effort normal and breath sounds normal. No respiratory distress. She has no wheezes.   Abdominal: Soft. Bowel sounds are normal. She exhibits no distension. There is no tenderness.   Musculoskeletal: Normal range of motion.   Neurological: She is alert. She has normal strength. She is disoriented. GCS eye subscore is 4. GCS verbal subscore is 4. GCS motor subscore is 6.   Reflex Scores:       Bicep reflexes are 2+ on the right side and 2+ on the left side.       Patellar reflexes are 2+ on the right side and 2+ on the left side.  Skin: Skin is warm and dry. Capillary refill takes less than 2 seconds. She is not diaphoretic.   Nursing note and vitals reviewed.      Procedures  none         ED Course      Labs Reviewed   URINALYSIS W/ CULTURE IF INDICATED - Abnormal; Notable for the following:        Result Value    Leuk Esterase, UA Trace (*)     All other components within normal limits   URINALYSIS, MICROSCOPIC  ONLY - Abnormal; Notable for the following:     RBC, UA 0-2 (*)     WBC, UA 6-12 (*)     Bacteria, UA Trace (*)     All other components within normal limits   CBC WITH AUTO DIFFERENTIAL - Abnormal; Notable for the following:     WBC 10.41 (*)     RDW 14.7 (*)     RDW-SD 47.7 (*)     Immature Grans, Absolute 0.04 (*)     All other components within normal limits   COMPREHENSIVE METABOLIC PANEL - Normal    Narrative:     The MDRD GFR formula is only valid for adults with stable renal function between ages 18 and 70.   TROPONIN (IN-HOUSE) - Normal   BNP (IN-HOUSE) - Normal   CK - Normal   MAGNESIUM - Normal   TSH - Normal   URINE CULTURE   CBC AND DIFFERENTIAL    Narrative:     The following orders were created for panel order CBC & Differential.  Procedure                               Abnormality         Status                     ---------                               -----------         ------                     CBC Auto Differential[[id number]]        Abnormal            Final result                 Please view results for these tests on the individual orders.   EXTRA TUBES    Narrative:     The following orders were created for panel order Extra Tubes.  Procedure                               Abnormality         Status                     ---------                               -----------         ------                     Light Blue Top[[id number]]                                   In process                   Please view results for these tests on the individual orders.   LIGHT BLUE TOP     Ct Head Without Contrast    Result Date: 10/8/2018  Narrative: PROCEDURE: CT head without contrast REASON FOR EXAM: fall, pain This exam was performed according to our departmental dose-optimization program, which includes automated exposure control, adjustment of the mA and/or kV according to patient size and/or use of iterative reconstruction technique. FINDINGS: No comparison. Axial computer tomography sequential  imaging of the head was performed from the vertex to the base of the skull. .Sagittal and coronal reformation was performed . The skull vault is intact. Paranasal sinuses and bilateral mastoid air cells are well aerated. Mild to moderate cerebral involutional changes. Cerebral and cerebellar parenchymal are otherwise unremarkable. Ventricular system and subarachnoid spaces are normal.     Impression: 1.  No acute intracranial abnormality. 2.  Mild to moderate cerebral involutional changes. Electronically signed by:  Martell Lemus MD  10/8/2018 9:48 AM CDT Workstation: [id number]    Ct Cervical Spine Without Contrast    Result Date: 10/8/2018  Narrative: PROCEDURE: Ct cervical spine without contrast REASON FOR EXAM: fall, pain This exam was performed according to our departmental dose-optimization program, which includes automated exposure control, adjustment of the mA and/or kV according to patient size and/or use of iterative reconstruction technique. FINDINGS: No comparison. Axial computer tomography sequential imaging was performed of the cervical spine from the skull base through the thoracic inlet without IV contrast administration. Sagittal and coronal reformates was performed.  Cervical spine vertebral body heights and alignment are normal. There is no evidence of fracture or dislocation. Soft tissue structures unremarkable. C2-C3: Disc space normal. No disc protrusion or extrusion. Spinal cord and nerve roots exit without compromise. C3-C4: Disc space normal. No disc protrusion or extrusion. Spinal cord and nerve roots exit without compromise. C4-C5: Disc space normal. No disc protrusion or extrusion. Spinal cord and nerve roots exit without compromise. C5-C6: Degenerative disc disease with loss of disc space height as well as anterior posterior osteophytosis which is mildly indenting the anterior thecal sac. Spinal cord and nerve roots exit without compromise. C6-C7: Degenerative disc disease with loss of disc  space height. No disc protrusion or extrusion. Spinal cord and nerve roots exit without compromise. C7-T1: Disc space normal. No disc protrusion or extrusion. Spinal cord and nerve roots exit without compromise.     Impression: 1. C5-C6 and C6-C7 degenerative disc disease as described above. Spinal cord and nerve roots appear to exit these levels without compromise.. 2. No acute CT cervical spine abnormality.. Electronically signed by:  Martell Lemus MD  10/8/2018 10:01 AM CDT Workstation: [id number]    Ct Thoracic Spine Without Contrast    Result Date: 10/8/2018  Narrative: Procedure: CT thoracic spine without contrast Reason for exam: Fall, pain. FINDINGS: Axial computer tomography sequential imaging was performed of the thoracic spine without IV contrast. Sagittal and coronal reformation was performed. This exam was performed according to our departmental dose optimization program, which includes automated exposure control, adjustment of the mA and/or KV according to patient size and/or use of iterative reconstruction technique. No comparison exam. Thoracic spine vertebral body heights and alignment are normal. There is no evidence of fracture or dislocation identified. Soft tissue structures unremarkable. T8-T9: Degenerative disc disease with loss of disc space height as well as posterior osteophytosis which is abutting the anterior aspect of the thoracic spinal cord. Otherwise thoracic spinal cord and nerve roots exit without compromise. Otherwise thoracic spine disc spaces are intact. The spinal cord and nerve roots appear to exit all levels without compromise.     Impression: 1.  No acute CT thoracic spine abnormality. 2.  T8-T9: Degenerative disc disease with loss of disc space height as well as posterior osteophytosis which is abutting the anterior aspect of the thoracic spinal cord. Otherwise thoracic spinal cord and nerve roots exit without compromise. Electronically signed by:  Martell Lemus MD  10/8/2018 10:12  DAVID CDT Workstation: [id number]    Ct Lumbar Spine Without Contrast    Result Date: 10/8/2018  Narrative: Procedure: CT lumbar spine without contrast Reason for exam: Fall, pain. FINDINGS: Axial computer tomography sequential imaging of the lumbar spine was performed without IV contrast administration. Sagittal and coronal reformates was performed. This exam was performed according to our departmental dose optimization program, which includes automated exposure control, adjustment of the mA and/or KV according to patient size and/or use of iterative reconstruction technique. Degenerative grade 1 spondylolisthesis of the L4 in relation L5 vertebral body. Vertebral body heights and alignment are otherwise unremarkable. There is no evidence of acute fracture or dislocation. L1-L2: Disc space normal. No disc protrusion or extrusion. Nerve roots exit without compromise. L2-L3: Mild diffuse disc protrusion minimally indenting the anterior thecal sac. Nerve roots exit without compromise. L3-L4: Disc space normal. No disc protrusion or extrusion. Nerve roots exit without compromise. L4-L5: Degenerative disc disease with partial vacuum disc. Mild posterior central broad-based disc protrusion mildly indenting the anterior thecal sac. Nerve roots exit without compromise. L5-S1: Degenerative disc disease with partial vacuum disks and loss of disc space height. Nerve roots exit without compromise. Incidentally noted is a ventral lower abdominal hernia with herniation of omental fat into this hernia.     Impression: 1.  Degenerative grade 1 spondylolisthesis of the L4 in relation L5 vertebral body. 2.  L2-L3: Mild diffuse disc protrusion minimally indenting the anterior thecal sac. Nerve roots exit without compromise. 3.  L4-L5: Degenerative disc disease with partial vacuum disc. Mild posterior central broad-based disc protrusion mildly indenting the anterior thecal sac. Nerve roots exit without compromise. 4.  L5-S1: Degenerative  disc disease with partial vacuum disks and loss of disc space height. Nerve roots exit without compromise. 5.  Incidentally noted is a ventral lower abdominal hernia with herniation of omental fat into this hernia. Electronically signed by:  Martell Lemus MD  10/8/2018 9:54 AM CDT Workstation: [id number]    Ct Abdomen Pelvis With Contrast    Result Date: 10/2/2018  Narrative: Patient Name: SHAUN MCPHERSON ORDERING: KATHIA HAWTHORNE ATTENDING: KATHIA HAWTHORNE REFERRING: KATHIA HAWTHORNE ----------------------- EXAM DESCRIPTION: CT ABDOMEN PELVIS W CONTRAST HISTORY: Left upper quadrant abdominal pain. COMPARISON: None Dose Length Product: 391.6. This exam was performed according to our departmental dose optimization program, which includes automated exposure control, adjustment of the mA and/or KV according to patient size and/or use of iterative reconstruction technique. CONTRAST: Oral and 80 cc intravenous Isovue 300.  TECHNIQUE: Multiple contiguous contrast enhanced axial images are obtained of the abdomen and pelvis. FINDINGS: LOWER CHEST: Minimal dependent changes and scarring within the bases. No consolidation or effusion. Borderline cardiac enlargement without pericardial effusion. Coronary vascular calcification is present. HEPATOBILIARY: No suspicious hepatic lesion or ductal dilation demonstrated. The gallbladder is mildly distended measuring 10.7 cm in its length. No calcified stones or pericholecystic inflammatory change. SPLEEN: Unremarkable. PANCREAS: Unremarkable ADRENAL GLANDS: Unremarkable. KIDNEYS/URETERS: There is excretion of contrast by both kidneys with no findings of obstructive uropathy. No pelvicalyceal dilation or blunting identified. No suspicious renal mass identified. GASTROINTESTINAL: There is a small hiatal hernia. No evidence of bowel obstruction.. The colon is redundant. No acute inflammatory changes. REPRODUCTIVE ORGANS: Hysterectomy. URINARY BLADDER: Mild fairly uniform wall thickening  which is nonspecific but may represent sequela of cystitis. The perivesicular fat does not appear infiltrated. VASCULAR: Calcification without abdominal aortic aneurysm. LYMPH NODES: No pathologically enlarged nodes by size criteria. PERITONEUM/RETROPERITONEUM: No free air or suspicious fluid collections. OSSEOUS STRUCTURES: Demineralization and degenerative changes within the spine. The greatest degree of disc space narrowing and facet arthropathy is seen at L4-5 and L5-S1. There is grade 1 degenerative anterolisthesis at L4-5. Degenerative changes present involving both hips. ADDITIONAL FINDINGS: There is moderate to large fat-containing midline periumbilical hernia.     Impression: The gallbladder is mildly distended measuring 10.7 cm in length. No calcified stones or pericholecystic inflammatory change. This finding should be correlated to the clinical exam and laboratory data. Small hiatal hernia. Moderate to large fat-containing midline abdominal pelvic wall hernia. Mild uniform urinary bladder wall thickening, nonspecific, however may represent sequela of cystitis. Demineralization and degenerative changes within the lumbar spine. Electronically signed by:  Brendan Bell MD  10/2/2018 12:14 PM CDT Workstation: [id number]    Xr Chest 1 View    Result Date: 10/8/2018  Narrative: PROCEDURE: Chest, upright AP at 8:14 AM. INDICATION: Fall. Pain. COMPARISON: None. Heart size is normal with normal pulmonary vascularity. Lungs are clear. No pleural effusions. Visualized bony structures unremarkable.     Impression: No acute disease. [id number] Electronically signed by:  George Napier MD  10/8/2018 8:56 AM CDT Workstation: [id number]    EKG October 8, 2018 at 1243 reveals normal sinus rhythm at a rate of 73 beats per minute without any evidence of acute ischemia.        MDM  Number of Diagnoses or Management Options  Altered mental status, unspecified altered mental status type:   FTT (failure to thrive) in adult:    Weight loss:      Amount and/or Complexity of Data Reviewed  Clinical lab tests: reviewed  Tests in the radiology section of CPT®: reviewed  Discuss the patient with other providers: yes    Patient Progress  Patient progress: stable    Patient brought to emergency department due to an accidental fall that was witnessed by staff.  This is reportedly her second fall.  Imaging of the head cervical thoracic and lumbar spine reveals no acute osseous abnormality.  There is degenerative change throughout the spine.  Urinalysis is improved from recent with treatment for her urinary tract infection.  White blood cell count is slightly elevated from 7-10 remainder of laboratory studies and electrolytes are unremarkable.  Patient is altered but is not combative.  She has no focal weakness and is moving all 4 extremities.  Daughter states that this is significantly increased altered mental status from her baseline dementia.  She is also concerned about a significant amount of weight loss.  After discussion with hospitalist patient will be admitted for further evaluation.    Final diagnoses:   Altered mental status, unspecified altered mental status type   FTT (failure to thrive) in adult   Weight loss            Butch Daigle, DO  10/08/18 1257

## 2018-10-08 NOTE — ED NOTES
"Called [facility] to get a report of events that occurred prior to fall and arrival.  Informed per crystal, rct, pt's fall was witnessed.  States pt was sitting in wheelchair at dining table as resident pushed herself to move backwards, resident \"flipped over the chair backward and hit her head\".  robin PADILLA, denies pt with loss of consciousness.      Dee Hood RN  10/08/18 0845       Dee Hood RN  10/08/18 0852    "

## 2018-10-08 NOTE — H&P
HISTORY AND PHYSICAL   [facility]        Patient Identification:  Name: Kym Fraser  Age: 78 y.o.  Sex: female  :  1940  MRN: [id number]                     Primary Care Physician: Brendan Longoria MD    Chief Complaint:  Altered mental status and hx of fall, weakness    History of Present Illness:          The patient is 78-year-old white female with history of dementia, depression, anxiety, diabetes mellitus, GERD, hypertension and recent urinary tract infection who was admitted from the nursing facility with history of having fallen down and being more confused with altered mental status compared to her baseline.  The patient's quite demented and it's difficult to get much history from her.  The patient had been on antibiotics for UTI and her urinalysis still showed some white cells and bacteria.  Her evaluation the ER including CT of head cervical thoracic and lumbar spine did not show any acute process.  The patient did state that she's had some soreness in her chest.  She says she's felt sick and very weak and has had some nausea and vomiting.  She did complain of some burning with urination and some dysuria.  The patient was evaluated in the ER and admitted for further evaluation of her altered mental status and UTI with history of falling.    Past Medical History:  Past Medical History:   Diagnosis Date   • Anxiety    • Asthma    • Dementia    • Depression    • Diabetes mellitus (CMS/[facility])    • GERD (gastroesophageal reflux disease)    • Hemorrhoids    • Hypertension    • Nervous breakdown    • Nervous breakdown      Past Surgical History:  Past Surgical History:   Procedure Laterality Date   • EYE SURGERY      cataract   • HYSTERECTOMY     • MASTECTOMY      partial (pt has both breasts)   • POLYPECTOMY        Home Meds:  Prescriptions Prior to Admission   Medication Sig Dispense Refill Last Dose   • amLODIPine (NORVASC) 5 MG tablet Take 5 mg by mouth Daily.   10/8/2018 at  Unknown time   • aspirin 81 MG chewable tablet Chew 81 mg Daily.   10/8/2018 at Unknown time   • brimonidine-timolol (COMBIGAN) 0.2-0.5 % ophthalmic solution Every 12 (Twelve) Hours.   10/8/2018 at Unknown time   • cephalexin (KEFLEX) 500 MG capsule Take 1 capsule by mouth 4 (Four) Times a Day. 28 capsule 0 10/8/2018 at Unknown time   • donepezil (ARICEPT) 10 MG tablet Take 10 mg by mouth Every Night.   10/7/2018 at Unknown time   • [START ON 10/10/2018] Dulaglutide 0.75 MG/0.5ML solution pen-injector Inject 0.75 mg under the skin into the appropriate area as directed 1 (One) Time Per Week. Patient takes on Wednesdays      • famotidine (PEPCID) 20 MG tablet Take 20 mg by mouth 2 (Two) Times a Day.   10/8/2018 at Unknown time   • FLUoxetine (PROzac) 20 MG capsule Take 20 mg by mouth Daily.   10/8/2018 at Unknown time   • insulin aspart (novoLOG) 100 UNIT/ML injection Inject  under the skin into the appropriate area as directed 3 (Three) Times a Day Before Meals. Sliding scale      • insulin glargine (LANTUS) 100 UNIT/ML injection Inject 15 Units under the skin into the appropriate area as directed 2 (Two) Times a Day.      • lisinopril (PRINIVIL,ZESTRIL) 10 MG tablet Take 10 mg by mouth Daily.   10/7/2018 at Unknown time   • lisinopril (PRINIVIL,ZESTRIL) 20 MG tablet Take 20 mg by mouth Daily.   10/7/2018 at Unknown time   • memantine (NAMENDA) 10 MG tablet Take 10 mg by mouth 2 (Two) Times a Day.   10/8/2018 at Unknown time   • metFORMIN (GLUCOPHAGE) 500 MG tablet Take 500 mg by mouth 2 (Two) Times a Day With Meals.   10/8/2018 at Unknown time   • mirtazapine (REMERON) 15 MG tablet Take 15 mg by mouth Every Night.   10/7/2018 at Unknown time   • montelukast (SINGULAIR) 10 MG tablet Take 10 mg by mouth Every Night.   10/8/2018 at Unknown time   • Omega-3 Fatty Acids (FISH OIL) 1000 MG capsule capsule Take  by mouth Daily With Breakfast.   10/8/2018 at Unknown time   • simvastatin (ZOCOR) 20 MG tablet Take 20 mg by  mouth Every Night.   10/7/2018 at Unknown time   • glucagon (GLUCAGEN) 1 MG injection Infuse  into a venous catheter 1 (One) Time.      • ondansetron ODT (ZOFRAN-ODT) 4 MG disintegrating tablet Take 1 tablet by mouth Every 6 (Six) Hours As Needed for Nausea or Vomiting. 10 tablet 0      CURRENT MEDS    Current Facility-Administered Medications:   •  Influenza Vac Subunit Quad (FLUCELVAX) injection 0.5 mL, 0.5 mL, Intramuscular, During Hospitalization, Alfonso Gallego MD  •  pneumococcal polysaccharide 23-valent (PNEUMOVAX-23) vaccine 0.5 mL, 0.5 mL, Intramuscular, During Hospitalization, Alfonso Gallego MD  •  Insert peripheral IV, , , Once **AND** sodium chloride 0.9 % flush 10 mL, 10 mL, Intravenous, PRN, Butch Daigle DO, 10 mL at 10/08/18 1150  Allergies:  No Known Allergies  Immunizations:  Immunization History   Administered Date(s) Administered   • Flu Mist 01/01/2017     Social History:   Social History     Social History Narrative   • No narrative on file     Social History     Social History   • Marital status:      Spouse name: N/A   • Number of children: N/A   • Years of education: N/A     Occupational History   • Not on file.     Social History Main Topics   • Smoking status: Never Smoker   • Smokeless tobacco: Not on file   • Alcohol use No   • Drug use: No   • Sexual activity: Defer     Other Topics Concern   • Not on file     Social History Narrative   • No narrative on file       Family History:  History reviewed. No pertinent family history.     Review of Systems  See history of present illness and past medical history.  Patient denies change in vision, change in hearing, change in taste, changes in weight, changes in appetite, focal weakness, numbness, or paresthesia.  Patient denies chest pain, palpitations, dyspnea, orthopnea, PND, cough, sinus pressure, rhinorrhea, epistaxis, hemoptysis, hematemesis, diarrhea, constipation or hematchezia.  Denies cold or heat intolerance,  "polydipsia, polyuria, polyphagia. Denies hematuria, pyuria, dysuria, hesitancy, frequency or urgency.     Remainder of ROS is negative.    Objective:  tMax 24 hrs: Temp (24hrs), Av °F (36.7 °C), Min:97.8 °F (36.6 °C), Max:98.1 °F (36.7 °C)    Vitals Ranges:   Temp:  [97.8 °F (36.6 °C)-98.1 °F (36.7 °C)] 97.8 °F (36.6 °C)  Heart Rate:  [72-78] 75  Resp:  [16-18] 16  BP: (136-151)/(62-72) 140/72      Exam:  /72 (BP Location: Right arm, Patient Position: Lying)   Pulse 75   Temp 97.8 °F (36.6 °C) (Oral)   Resp 16   Ht 165.1 cm (65\")   Wt 63.1 kg (139 lb 3.2 oz)   SpO2 96%   BMI 23.16 kg/m²     General Appearance:    Alert, cooperative, no distress, appears stated age   Head:    Normocephalic, without obvious abnormality, atraumatic   Eyes:    PERRL, conjunctiva/corneas clear, EOM's intact, both eyes   Ears:    Normal external ear canals, both ears   Nose:   Nares normal, septum midline, mucosa normal, no drainage    or sinus tenderness   Throat:   Lips, mucosa, and tongue normal   Neck:   Supple, symmetrical, trachea midline, no adenopathy;     thyroid:  no enlargement/tenderness/nodules; no carotid    bruit or JVD   Back:     Symmetric, no curvature, ROM normal, no CVA tenderness   Lungs:     Clear to auscultation bilaterally, respirations unlabored   Chest Wall:    No tenderness or deformity    Heart:    Regular rate and rhythm, S1 and S2 normal, no murmur, rub   or gallop   Abdomen:     Soft, non-tender, bowel sounds active all four quadrants,     no masses, no hepatomegaly, no splenomegaly   Extremities:   Extremities normal, atraumatic, no cyanosis or edema   Pulses:   2+ and symmetric all extremities   Skin:   Skin color, texture, turgor normal, no rashes or lesions   Lymph nodes:   Cervical, supraclavicular, and axillary nodes normal   Neurologic:   CNII-XII intact, normal strength, sensation intact throughout, she is confused and has very poor memory.  She is oriented to her name only.  She has " no definite focal neurologic deficits.        .    Data Review:  Lab Results (last 72 hours)     Procedure Component Value Units Date/Time    CRE Screen by PCR - Swab, Large Intestine, Rectum [[id number]] Collected:  10/08/18 1452    Specimen:  Swab from Large Intestine, Rectum Updated:  10/08/18 1504    Extra Tubes [[id number]] Collected:  10/08/18 1149    Specimen:  Blood from Blood, Venous Line Updated:  10/08/18 1300    Narrative:       The following orders were created for panel order Extra Tubes.  Procedure                               Abnormality         Status                     ---------                               -----------         ------                     Light Blue Top[[id number]]                                   Final result                 Please view results for these tests on the individual orders.    Light Blue Top [[id number]] Collected:  10/08/18 1149    Specimen:  Blood Updated:  10/08/18 1300     Extra Tube hold for add-on     Comment: Auto resulted       TSH [[id number]]  (Normal) Collected:  10/08/18 1149    Specimen:  Blood Updated:  10/08/18 1249     TSH 0.620 mIU/mL     BNP [[id number]]  (Normal) Collected:  10/08/18 1149    Specimen:  Blood Updated:  10/08/18 1232     proBNP 173.0 pg/mL     Troponin [[id number]]  (Normal) Collected:  10/08/18 1149    Specimen:  Blood Updated:  10/08/18 1232     Troponin I <0.012 ng/mL     CK [[id number]]  (Normal) Collected:  10/08/18 1149    Specimen:  Blood Updated:  10/08/18 1226     Creatine Kinase 99 U/L     Comprehensive Metabolic Panel [[id number]]  (Normal) Collected:  10/08/18 1149    Specimen:  Blood Updated:  10/08/18 1220     Glucose 98 mg/dL      BUN 15 mg/dL      Creatinine 0.66 mg/dL      Sodium 141 mmol/L      Potassium 3.8 mmol/L      Chloride 104 mmol/L      CO2 24.0 mmol/L      Calcium 9.5 mg/dL      Total Protein 6.6 g/dL      Albumin 3.60 g/dL      ALT (SGPT) 38 U/L      AST (SGOT) 32 U/L      Alkaline Phosphatase 69 U/L      Total  Bilirubin 0.5 mg/dL      eGFR Non African Amer 87 mL/min/1.73      Globulin 3.0 gm/dL      A/G Ratio 1.2 g/dL      BUN/Creatinine Ratio 22.7     Anion Gap 13.0 mmol/L     Narrative:       The MDRD GFR formula is only valid for adults with stable renal function between ages 18 and 70.    Magnesium [[id number]]  (Normal) Collected:  10/08/18 1149    Specimen:  Blood Updated:  10/08/18 1220     Magnesium 1.9 mg/dL     CBC & Differential [[id number]] Collected:  10/08/18 1149    Specimen:  Blood Updated:  10/08/18 1213    Narrative:       The following orders were created for panel order CBC & Differential.  Procedure                               Abnormality         Status                     ---------                               -----------         ------                     CBC Auto Differential[[id number]]        Abnormal            Final result                 Please view results for these tests on the individual orders.    CBC Auto Differential [[id number]]  (Abnormal) Collected:  10/08/18 1149    Specimen:  Blood Updated:  10/08/18 1213     WBC 10.41 (H) 10*3/mm3      RBC 4.50 10*6/mm3      Hemoglobin 13.6 g/dL      Hematocrit 40.1 %      MCV 89.1 fL      MCH 30.2 pg      MCHC 33.9 g/dL      RDW 14.7 (H) %      RDW-SD 47.7 (H) fl      MPV 9.9 fL      Platelets 248 10*3/mm3      Neutrophil % 76.6 %      Lymphocyte % 18.0 %      Monocyte % 4.4 %      Eosinophil % 0.5 %      Basophil % 0.1 %      Immature Grans % 0.4 %      Neutrophils, Absolute 7.98 10*3/mm3      Lymphocytes, Absolute 1.87 10*3/mm3      Monocytes, Absolute 0.46 10*3/mm3      Eosinophils, Absolute 0.05 10*3/mm3      Basophils, Absolute 0.01 10*3/mm3      Immature Grans, Absolute 0.04 (H) 10*3/mm3     Urinalysis, Microscopic Only - Urine, Clean Catch [[id number]]  (Abnormal) Collected:  10/08/18 0833    Specimen:  Urine from Urine, Catheter In/Out Updated:  10/08/18 0921     RBC, UA 0-2 (A) /HPF      WBC, UA 6-12 (A) /HPF      Bacteria, UA Trace (A)  /HPF      Squamous Epithelial Cells, UA 0-2 /HPF      Hyaline Casts, UA 3-6 /LPF      Methodology Automated Microscopy    Urinalysis With Culture If Indicated - Urine, Catheter [[id number]]  (Abnormal) Collected:  10/08/18 0833    Specimen:  Urine from Urine, Catheter In/Out Updated:  10/08/18 0907     Color, UA Yellow     Appearance, UA Clear     pH, UA 5.5     Specific Gravity, UA 1.020     Glucose, UA Negative     Ketones, UA Negative     Bilirubin, UA Negative     Blood, UA Negative     Protein, UA Negative     Leuk Esterase, UA Trace (A)     Nitrite, UA Negative     Urobilinogen, UA 0.2 E.U./dL    Urine Culture - Urine, [[id number]] Collected:  10/08/18 0833    Specimen:  Urine from Urine, Catheter In/Out Updated:  10/08/18 0907            Results from last 7 days  Lab Units 10/08/18  1149   TSH mIU/mL 0.620          Imaging Results (all)     Procedure Component Value Units Date/Time    CT Thoracic Spine Without Contrast [[id number]] Collected:  10/08/18 0927     Updated:  10/08/18 1013    Narrative:       Procedure: CT thoracic spine without contrast    Reason for exam: Fall, pain.    FINDINGS: Axial computer tomography sequential imaging was  performed of the thoracic spine without IV contrast. Sagittal and  coronal reformation was performed. This exam was performed  according to our departmental dose optimization program, which  includes automated exposure control, adjustment of the mA and/or  KV according to patient size and/or use of iterative  reconstruction technique. No comparison exam.    Thoracic spine vertebral body heights and alignment are normal.  There is no evidence of fracture or dislocation identified. Soft  tissue structures unremarkable.    T8-T9: Degenerative disc disease with loss of disc space height  as well as posterior osteophytosis which is abutting the anterior  aspect of the thoracic spinal cord. Otherwise thoracic spinal  cord and nerve roots exit without compromise.    Otherwise  thoracic spine disc spaces are intact. The spinal cord  and nerve roots appear to exit all levels without compromise.      Impression:       1.  No acute CT thoracic spine abnormality.  2.  T8-T9: Degenerative disc disease with loss of disc space  height as well as posterior osteophytosis which is abutting the  anterior aspect of the thoracic spinal cord. Otherwise thoracic  spinal cord and nerve roots exit without compromise.    Electronically signed by:  Martell Lemus MD  10/8/2018 10:12 AM CDT  Workstation: [id number]    CT Cervical Spine Without Contrast [[id number]] Collected:  10/08/18 0927     Updated:  10/08/18 1002    Narrative:         PROCEDURE: Ct cervical spine without contrast    REASON FOR EXAM: fall, pain    This exam was performed according to our departmental  dose-optimization program, which includes automated exposure  control, adjustment of the mA and/or kV according to patient size  and/or use of iterative reconstruction technique.    FINDINGS: No comparison. Axial computer tomography sequential  imaging was performed of the cervical spine from the skull base  through the thoracic inlet without IV contrast administration.  Sagittal and coronal reformates was performed.     Cervical spine vertebral body heights and alignment are normal.  There is no evidence of fracture or dislocation. Soft tissue  structures unremarkable.    C2-C3: Disc space normal. No disc protrusion or extrusion. Spinal  cord and nerve roots exit without compromise.    C3-C4: Disc space normal. No disc protrusion or extrusion. Spinal  cord and nerve roots exit without compromise.    C4-C5: Disc space normal. No disc protrusion or extrusion. Spinal  cord and nerve roots exit without compromise.    C5-C6: Degenerative disc disease with loss of disc space height  as well as anterior posterior osteophytosis which is mildly  indenting the anterior thecal sac. Spinal cord and nerve roots  exit without compromise.    C6-C7: Degenerative  disc disease with loss of disc space height.  No disc protrusion or extrusion. Spinal cord and nerve roots exit  without compromise.    C7-T1: Disc space normal. No disc protrusion or extrusion. Spinal  cord and nerve roots exit without compromise.        Impression:       1. C5-C6 and C6-C7 degenerative disc disease as described above.  Spinal cord and nerve roots appear to exit these levels without  compromise..  2. No acute CT cervical spine abnormality..    Electronically signed by:  Martell Lemus MD  10/8/2018 10:01 AM CDT  Workstation: [id number]    CT Lumbar Spine Without Contrast [[id number]] Collected:  10/08/18 0927     Updated:  10/08/18 0956    Narrative:       Procedure: CT lumbar spine without contrast    Reason for exam: Fall, pain.    FINDINGS: Axial computer tomography sequential imaging of the  lumbar spine was performed without IV contrast administration.  Sagittal and coronal reformates was performed. This exam was  performed according to our departmental dose optimization  program, which includes automated exposure control, adjustment of  the mA and/or KV according to patient size and/or use of  iterative reconstruction technique.    Degenerative grade 1 spondylolisthesis of the L4 in relation L5  vertebral body. Vertebral body heights and alignment are  otherwise unremarkable. There is no evidence of acute fracture or  dislocation.    L1-L2: Disc space normal. No disc protrusion or extrusion. Nerve  roots exit without compromise.    L2-L3: Mild diffuse disc protrusion minimally indenting the  anterior thecal sac. Nerve roots exit without compromise.    L3-L4: Disc space normal. No disc protrusion or extrusion. Nerve  roots exit without compromise.    L4-L5: Degenerative disc disease with partial vacuum disc. Mild  posterior central broad-based disc protrusion mildly indenting  the anterior thecal sac. Nerve roots exit without compromise.    L5-S1: Degenerative disc disease with partial vacuum disks  and  loss of disc space height. Nerve roots exit without compromise.    Incidentally noted is a ventral lower abdominal hernia with  herniation of omental fat into this hernia.      Impression:       1.  Degenerative grade 1 spondylolisthesis of the L4 in relation  L5 vertebral body.  2.  L2-L3: Mild diffuse disc protrusion minimally indenting the  anterior thecal sac. Nerve roots exit without compromise.  3.  L4-L5: Degenerative disc disease with partial vacuum disc.  Mild posterior central broad-based disc protrusion mildly  indenting the anterior thecal sac. Nerve roots exit without  compromise.  4.  L5-S1: Degenerative disc disease with partial vacuum disks  and loss of disc space height. Nerve roots exit without  compromise.  5.  Incidentally noted is a ventral lower abdominal hernia with  herniation of omental fat into this hernia.    Electronically signed by:  Martell Lemus MD  10/8/2018 9:54 AM CDT  Workstation: [id number]    CT Head Without Contrast [[id number]] Collected:  10/08/18 0926     Updated:  10/08/18 0949    Narrative:         PROCEDURE: CT head without contrast    REASON FOR EXAM: fall, pain    This exam was performed according to our departmental  dose-optimization program, which includes automated exposure  control, adjustment of the mA and/or kV according to patient size  and/or use of iterative reconstruction technique.    FINDINGS: No comparison. Axial computer tomography sequential  imaging of the head was performed from the vertex to the base of  the skull. .Sagittal and coronal reformation was performed .    The skull vault is intact. Paranasal sinuses and bilateral  mastoid air cells are well aerated. Mild to moderate cerebral  involutional changes. Cerebral and cerebellar parenchymal are  otherwise unremarkable. Ventricular system and subarachnoid  spaces are normal.      Impression:       1.  No acute intracranial abnormality.  2.  Mild to moderate cerebral involutional  changes.    Electronically signed by:  Martell Lemus MD  10/8/2018 9:48 AM CDT  Workstation: [id number]    XR Chest 1 View [[id number]] Collected:  10/08/18 0834     Updated:  10/08/18 0857    Narrative:       PROCEDURE: Chest, upright AP at 8:14 AM.    INDICATION: Fall. Pain.    COMPARISON: None.    Heart size is normal with normal pulmonary vascularity. Lungs are  clear. No pleural effusions.    Visualized bony structures unremarkable.      Impression:       No acute disease.    [id number]    Electronically signed by:  George Napier MD  10/8/2018 8:56  AM CDT Workstation: [facility]        Patient Active Problem List   Diagnosis Code   • Vascular dementia without behavioral disturbance F01.50   • Type 2 diabetes mellitus with diabetic polyneuropathy, with long-term current use of insulin (CMS/HCC) E11.42, Z79.4   • Hypertension associated with diabetes (CMS/HCC) E11.59, I10   • B12 deficiency E53.8   • Vitamin D deficiency E55.9   • Mixed diabetic hyperlipidemia associated with type 2 diabetes mellitus (CMS/HCC) E11.69, E78.2   • Altered mental status R41.82   • Weight loss R63.4   • FTT (failure to thrive) in adult R62.7   • UTI (urinary tract infection) N39.0       Assessment:  Active Hospital Problems    Diagnosis Date Noted   • **Altered mental status [R41.82] 10/08/2018   • Weight loss [R63.4] 10/08/2018   • FTT (failure to thrive) in adult [R62.7] 10/08/2018   • UTI (urinary tract infection) [N39.0] 10/08/2018   • Vascular dementia without behavioral disturbance [F01.50] 03/23/2017   • Type 2 diabetes mellitus with diabetic polyneuropathy, with long-term current use of insulin (CMS/HCC) [E11.42, Z79.4] 03/23/2017   • Hypertension associated with diabetes (CMS/HCC) [E11.59, I10] 03/23/2017   • Mixed diabetic hyperlipidemia associated with type 2 diabetes mellitus (CMS/HCC) [E11.69, E78.2] 03/23/2017   • B12 deficiency [E53.8] 03/23/2017      Resolved Hospital Problems    Diagnosis Date Noted Date Resolved   No  resolved problems to display.       Plan:  The patient's admitted to hospital and will continue with some IV fluids for hydration and IV antibiotics for UTI.  We'll check orthostatic blood pressure and some serial cardiac enzymes.  We'll ask for PT and OT eval.  We'll check some other baseline laboratory studies and we'll get MRI of brain without contrast.  Her daughter was concerned that she might be having a stroke.    Alfonso Gallego MD  10/8/2018  3:46 PM

## 2018-10-08 NOTE — ED NOTES
Pt's daughter at bedside. Pt's daughter reports that pt fell last week & hit her head & was also diagnosed with a uti last week. Pt's daughter states that pt has been more confused since that time. Pt has a small hematoma to the back of her head      Yaniv Verduzco, RN  10/08/18 0907       Yaniv Verduzco RN  10/08/18 0907

## 2018-10-08 NOTE — PLAN OF CARE
Problem: Patient Care Overview  Goal: Plan of Care Review  Outcome: Ongoing (interventions implemented as appropriate)   10/08/18 7397   Coping/Psychosocial   Plan of Care Reviewed With patient   Plan of Care Review   Progress no change   OTHER   Outcome Summary VSS, oriented to person, and will contiue to monitor     Goal: Individualization and Mutuality  Outcome: Ongoing (interventions implemented as appropriate)    Goal: Discharge Needs Assessment  Outcome: Ongoing (interventions implemented as appropriate)    Goal: Interprofessional Rounds/Family Conf  Outcome: Ongoing (interventions implemented as appropriate)      Problem: Fall Risk (Adult)  Goal: Identify Related Risk Factors and Signs and Symptoms  Outcome: Outcome(s) achieved Date Met: 10/08/18    Goal: Absence of Fall  Outcome: Outcome(s) achieved Date Met: 10/08/18      Problem: Skin Injury Risk (Adult)  Goal: Identify Related Risk Factors and Signs and Symptoms  Outcome: Ongoing (interventions implemented as appropriate)    Goal: Skin Health and Integrity  Outcome: Ongoing (interventions implemented as appropriate)

## 2018-10-08 NOTE — ED NOTES
"Pt keeps repeating \"help me get out of here\". Pt does not follow directions. Pt has attempted several times to get up from the bed. Bed alarm on     Yaniv Verduzco RN  10/08/18 0888    "

## 2018-10-08 NOTE — NURSING NOTE
"Transport brought patients ankle alarm upstairs from back of stretcher. Transport stated they were not told that the alarm was on the back of stretcher. Lamin from [facility] called about alarm. Lamin from [facility] stated, \"Oh I forgot to tell you I had to cut that off of the patient, it had to be removed because of the MRI and the magnetic field.\" RN asked Lamin to make a note of this and he refused stating he would speak to whoever he needed to when that happens\". Bhargav called and per Bhargav RN is suppose to give the alarm to patients daughter,   "

## 2018-10-09 LAB
ANION GAP SERPL CALCULATED.3IONS-SCNC: 10 MMOL/L (ref 5–15)
BACTERIA SPEC AEROBE CULT: NORMAL
BASOPHILS # BLD AUTO: 0.01 10*3/MM3 (ref 0–0.2)
BASOPHILS NFR BLD AUTO: 0.1 % (ref 0–2)
BUN BLD-MCNC: 20 MG/DL (ref 7–21)
BUN/CREAT SERPL: 25 (ref 7–25)
CALCIUM SPEC-SCNC: 9 MG/DL (ref 8.4–10.2)
CHLORIDE SERPL-SCNC: 106 MMOL/L (ref 95–110)
CO2 SERPL-SCNC: 23 MMOL/L (ref 22–31)
CREAT BLD-MCNC: 0.8 MG/DL (ref 0.5–1)
DEPRECATED RDW RBC AUTO: 48.6 FL (ref 36.4–46.3)
EOSINOPHIL # BLD AUTO: 0.23 10*3/MM3 (ref 0–0.7)
EOSINOPHIL NFR BLD AUTO: 3.4 % (ref 0–7)
ERYTHROCYTE [DISTWIDTH] IN BLOOD BY AUTOMATED COUNT: 14.9 % (ref 11.5–14.5)
GFR SERPL CREATININE-BSD FRML MDRD: 70 ML/MIN/1.73 (ref 39–90)
GLUCOSE BLD-MCNC: 139 MG/DL (ref 60–100)
GLUCOSE BLDC GLUCOMTR-MCNC: 115 MG/DL (ref 70–130)
GLUCOSE BLDC GLUCOMTR-MCNC: 137 MG/DL (ref 70–130)
GLUCOSE BLDC GLUCOMTR-MCNC: 61 MG/DL (ref 70–130)
GLUCOSE BLDC GLUCOMTR-MCNC: 78 MG/DL (ref 70–130)
HBA1C MFR BLD: 5.9 % (ref 4–5.6)
HCT VFR BLD AUTO: 36 % (ref 35–45)
HGB BLD-MCNC: 12.1 G/DL (ref 12–15.5)
IMM GRANULOCYTES # BLD: 0.02 10*3/MM3 (ref 0–0.02)
IMM GRANULOCYTES NFR BLD: 0.3 % (ref 0–0.5)
LYMPHOCYTES # BLD AUTO: 1.86 10*3/MM3 (ref 0.6–4.2)
LYMPHOCYTES NFR BLD AUTO: 27.5 % (ref 10–50)
MCH RBC QN AUTO: 30.2 PG (ref 26.5–34)
MCHC RBC AUTO-ENTMCNC: 33.6 G/DL (ref 31.4–36)
MCV RBC AUTO: 89.8 FL (ref 80–98)
MONOCYTES # BLD AUTO: 0.39 10*3/MM3 (ref 0–0.9)
MONOCYTES NFR BLD AUTO: 5.8 % (ref 0–12)
NEUTROPHILS # BLD AUTO: 4.26 10*3/MM3 (ref 2–8.6)
NEUTROPHILS NFR BLD AUTO: 62.9 % (ref 37–80)
PLATELET # BLD AUTO: 250 10*3/MM3 (ref 150–450)
PMV BLD AUTO: 9.5 FL (ref 8–12)
POTASSIUM BLD-SCNC: 3.8 MMOL/L (ref 3.5–5.1)
RBC # BLD AUTO: 4.01 10*6/MM3 (ref 3.77–5.16)
SODIUM BLD-SCNC: 139 MMOL/L (ref 137–145)
TROPONIN I SERPL-MCNC: <0.012 NG/ML
WBC NRBC COR # BLD: 6.77 10*3/MM3 (ref 3.2–9.8)

## 2018-10-09 PROCEDURE — 96361 HYDRATE IV INFUSION ADD-ON: CPT

## 2018-10-09 PROCEDURE — G0378 HOSPITAL OBSERVATION PER HR: HCPCS

## 2018-10-09 PROCEDURE — 97166 OT EVAL MOD COMPLEX 45 MIN: CPT

## 2018-10-09 PROCEDURE — 25010000002 CEFTRIAXONE PER 250 MG: Performed by: HOSPITALIST

## 2018-10-09 PROCEDURE — 85025 COMPLETE CBC W/AUTO DIFF WBC: CPT | Performed by: HOSPITALIST

## 2018-10-09 PROCEDURE — G8988 SELF CARE GOAL STATUS: HCPCS

## 2018-10-09 PROCEDURE — 80048 BASIC METABOLIC PNL TOTAL CA: CPT | Performed by: HOSPITALIST

## 2018-10-09 PROCEDURE — G8978 MOBILITY CURRENT STATUS: HCPCS

## 2018-10-09 PROCEDURE — 82962 GLUCOSE BLOOD TEST: CPT

## 2018-10-09 PROCEDURE — G8979 MOBILITY GOAL STATUS: HCPCS

## 2018-10-09 PROCEDURE — G8987 SELF CARE CURRENT STATUS: HCPCS

## 2018-10-09 PROCEDURE — 97162 PT EVAL MOD COMPLEX 30 MIN: CPT

## 2018-10-09 PROCEDURE — 84484 ASSAY OF TROPONIN QUANT: CPT | Performed by: HOSPITALIST

## 2018-10-09 PROCEDURE — 96365 THER/PROPH/DIAG IV INF INIT: CPT

## 2018-10-09 RX ADMIN — MEMANTINE 10 MG: 5 TABLET ORAL at 08:53

## 2018-10-09 RX ADMIN — FLUOXETINE 20 MG: 20 CAPSULE ORAL at 08:54

## 2018-10-09 RX ADMIN — FAMOTIDINE 20 MG: 20 TABLET ORAL at 08:54

## 2018-10-09 RX ADMIN — AMLODIPINE BESYLATE 5 MG: 5 TABLET ORAL at 08:54

## 2018-10-09 RX ADMIN — MIRTAZAPINE 15 MG: 15 TABLET, FILM COATED ORAL at 21:33

## 2018-10-09 RX ADMIN — MONTELUKAST SODIUM 10 MG: 10 TABLET, FILM COATED ORAL at 21:33

## 2018-10-09 RX ADMIN — METFORMIN HYDROCHLORIDE 500 MG: 500 TABLET, FILM COATED ORAL at 08:54

## 2018-10-09 RX ADMIN — METFORMIN HYDROCHLORIDE 500 MG: 500 TABLET, FILM COATED ORAL at 18:20

## 2018-10-09 RX ADMIN — DORZOLAMIDE HYDROCHLORIDE AND TIMOLOL MALEATE 1 DROP: 20; 5 SOLUTION/ DROPS OPHTHALMIC at 08:54

## 2018-10-09 RX ADMIN — DONEPEZIL HYDROCHLORIDE 10 MG: 10 TABLET ORAL at 21:33

## 2018-10-09 RX ADMIN — ASPIRIN 81 MG CHEWABLE TABLET 81 MG: 81 TABLET CHEWABLE at 08:56

## 2018-10-09 RX ADMIN — FAMOTIDINE 20 MG: 20 TABLET ORAL at 21:33

## 2018-10-09 RX ADMIN — CEFTRIAXONE SODIUM 1 G: 1 INJECTION, POWDER, FOR SOLUTION INTRAMUSCULAR; INTRAVENOUS at 16:47

## 2018-10-09 RX ADMIN — SODIUM CHLORIDE 100 ML/HR: 9 INJECTION, SOLUTION INTRAVENOUS at 16:52

## 2018-10-09 RX ADMIN — MEMANTINE 10 MG: 5 TABLET ORAL at 21:33

## 2018-10-09 RX ADMIN — LISINOPRIL 20 MG: 20 TABLET ORAL at 08:54

## 2018-10-09 RX ADMIN — DORZOLAMIDE HYDROCHLORIDE AND TIMOLOL MALEATE 1 DROP: 20; 5 SOLUTION/ DROPS OPHTHALMIC at 21:33

## 2018-10-09 RX ADMIN — ATORVASTATIN CALCIUM 10 MG: 10 TABLET, FILM COATED ORAL at 08:54

## 2018-10-09 NOTE — PLAN OF CARE
Problem: Patient Care Overview  Goal: Plan of Care Review  Outcome: Ongoing (interventions implemented as appropriate)   10/09/18 1501   Coping/Psychosocial   Plan of Care Reviewed With patient   OTHER   Outcome Summary PT evaluation completed as co-eval with OT. Pt agreeable to eval, but confused at times throughout. Pt able to perform bed mobility with min-modA and sit<>stand with modA and FW walker. Pt attempted to take steps to HOB, but unsuccessful d/t LE weakness. Pt would benefit from further skilled PT to increase strength, balance, and functional mobility. Upon d/c from acute care, recommend return to residential facility.

## 2018-10-09 NOTE — PLAN OF CARE
Problem: Patient Care Overview  Goal: Plan of Care Review  Outcome: Ongoing (interventions implemented as appropriate)      Problem: Skin Injury Risk (Adult)  Goal: Identify Related Risk Factors and Signs and Symptoms  Outcome: Ongoing (interventions implemented as appropriate)    Goal: Skin Health and Integrity  Outcome: Ongoing (interventions implemented as appropriate)      Problem: Urinary Tract Infection (Adult)  Goal: Signs and Symptoms of Listed Potential Problems Will be Absent, Minimized or Managed (Urinary Tract Infection)  Outcome: Ongoing (interventions implemented as appropriate)

## 2018-10-09 NOTE — PLAN OF CARE
Problem: Patient Care Overview  Goal: Plan of Care Review  Outcome: Ongoing (interventions implemented as appropriate)   10/09/18 1625   Coping/Psychosocial   Plan of Care Reviewed With patient;family   Plan of Care Review   Progress no change   OTHER   Outcome Summary Pt has poor to fair appetite. RD noted food preferences from family. Will send glucerna and icecream on trays to increase protein/tiffany intake.     Goal: Individualization and Mutuality  Outcome: Ongoing (interventions implemented as appropriate)   10/09/18 1625   Individualization   Patient Specific Interventions Please wake pt for meals and set up trays.

## 2018-10-09 NOTE — PLAN OF CARE
Problem: Patient Care Overview  Goal: Plan of Care Review   10/09/18 0915   Coping/Psychosocial   Plan of Care Reviewed With patient   OTHER   Outcome Summary OT eval completed this date co-eval with PT. Pt agreeable but confused and unable to get accurate information from pt at times. Pt required mod assist for sit to sup to sit. Pt was mod assist for sit to stand, Pt dependent for toileting after incontinenet. Pt set up assist for a bowel of cereal. Pt unable to tolerate side steppign with assist of 2. Pt could benefit from further skilled OT to reach maximum level fo independence with ADL. Recommend to d/c to SNF with further therapy.

## 2018-10-09 NOTE — THERAPY EVALUATION
Acute Care - Physical Therapy Initial Evaluation  [facility]     Patient Name: Kym Fraser  : 1940  MRN: [id number]  Today's Date: 10/9/2018   Onset of Illness/Injury or Date of Surgery: 10/08/18  Date of Referral to PT: 10/08/18  Referring Physician: Dr. Gallego      Admit Date: 10/8/2018    Visit Dx:     ICD-10-CM ICD-9-CM   1. Altered mental status, unspecified altered mental status type R41.82 780.97   2. FTT (failure to thrive) in adult R62.7 783.7   3. Weight loss R63.4 783.21   4. 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 (CMS/HCC)   • Altered mental status   • Weight loss   • FTT (failure to thrive) in adult   • UTI (urinary tract infection)     Past Medical History:   Diagnosis Date   • Anxiety    • Asthma    • Dementia    • Depression    • Diabetes mellitus (CMS/HCC)    • GERD (gastroesophageal reflux disease)    • Hemorrhoids    • Hypertension    • Nervous breakdown    • Nervous breakdown      Past Surgical History:   Procedure Laterality Date   • EYE SURGERY      cataract   • HYSTERECTOMY     • MASTECTOMY      partial (pt has both breasts)   • POLYPECTOMY          PT ASSESSMENT (last 12 hours)      Physical Therapy Evaluation     Row Name 10/09/18 0922          PT Evaluation Time/Intention    Subjective Information no complaints  -KW     Document Type evaluation  -KW     Mode of Treatment co-treatment;occupational therapy;physical therapy  -KW     Patient Effort good  -KW     Row Name 10/09/18 0923 10/09/18 0922       General Information    Patient Profile Reviewed?  -- yes  -KW    Onset of  Illness/Injury or Date of Surgery  -- 10/08/18  -    Referring Physician  -- MALVIN Gallego MD  -    Patient Observations  -- alert;cooperative;agree to therapy  -KW    Patient/Family Observations  -- no family present   -    General Observations of Patient  -- IV, telemetry, bed alarm, room air  -KW    Prior Level of Function  -- min assist:;gait;mod assist:;ADL's;grooming;dressing;bathing  -KW    Equipment Currently Used at Home  -- walker, rolling   not always able to use d/t confusion, according to RN at [facility]  -    Pertinent History of Current Functional Problem  -- Pt is a 79 yo female admitted d/t fall at nursing home and a UTI  -    Existing Precautions/Restrictions  -- fall  -KW    Limitations/Impairments  -- safety/cognitive  -    Equipment Issued to Patient --  -KW gait belt  -    Risks Reviewed  -- patient:;nausea/vomiting;LOB;dizziness;increased discomfort;change in vital signs;increased drainage;lines disloged  -    Benefits Reviewed  -- patient:;improve function;increase independence;increase strength;increase balance;decrease pain;decrease risk of DVT;improve skin integrity;increase knowledge  -    Row Name 10/09/18 0922          Relationship/Environment    Lives With facility resident  -     Row Name 10/09/18 0922          Resource/Environmental Concerns    Current Living Arrangements residential facility  -     Row Name 10/09/18 0922          Cognitive Assessment/Interventions    Additional Documentation Cognitive Assessment/Intervention (Group)  -     Row Name 10/09/18 0922          Cognitive Assessment/Intervention- PT/OT    Affect/Mental Status (Cognitive) confused  -     Orientation Status (Cognition) oriented to;person   to BD confused on other details  -     Follows Commands (Cognition) 50-74% accuracy;delayed response/completion;increased processing time needed;verbal cues/prompting required;repetition of directions required  -     Cognitive Function (Cognitive) memory  deficit  -KW     Memory Deficit (Cognitive) moderate deficit;mild deficit  -KW     Safety Deficit (Cognitive) mild deficit;ability to follow commands;awareness of need for assistance;insight into deficits/self awareness  -KW     Personal Safety Interventions fall prevention program maintained;gait belt;nonskid shoes/slippers when out of bed;supervised activity  -KW     Row Name 10/09/18 0922          Safety Issues, Functional Mobility    Safety Issues Affecting Function (Mobility) insight into deficits/self awareness;awareness of need for assistance  -KW     Impairments Affecting Function (Mobility) balance;cognition;endurance/activity tolerance;strength  -KW     Row Name 10/09/18 0922          Bed Mobility Assessment/Treatment    Bed Mobility Assessment/Treatment supine-sit;sit-supine  -KW     Supine-Sit [place] (Bed Mobility) moderate assist (50% patient effort)  -KW     Sit-Supine [place] (Bed Mobility) moderate assist (50% patient effort)  -KW     Bed Mobility, Safety Issues decreased use of arms for pushing/pulling;decreased use of legs for bridging/pushing  -KW     Assistive Device (Bed Mobility) bed rails;head of bed elevated  -KW     Row Name 10/09/18 0922          Transfer Assessment/Treatment    Transfer Assessment/Treatment sit-stand transfer;stand-sit transfer  -KW     Sit-Stand [place] (Transfers) moderate assist (50% patient effort)  -KW     Stand-Sit [place] (Transfers) moderate assist (50% patient effort)  -KW     Row Name 10/09/18 0922          Sit-Stand Transfer    Assistive Device (Sit-Stand Transfers) walker, front-wheeled  -KW     Row Name 10/09/18 0922          Stand-Sit Transfer    Assistive Device (Stand-Sit Transfers) walker, front-wheeled  -KW     Row Name 10/09/18 0922          Gait/Stairs Assessment/Training    Comment (Gait/Stairs) attempted to take step to HOB but pt unssuccessful, needing to sit down   -KW     Row Name 10/09/18 0922          General ROM    GENERAL  ROM COMMENTS BLE AROM WFL   -KW     Row Name 10/09/18 0922          MMT (Manual Muscle Testing)    General MMT Comments BLE grossly 3+/5 based on movement; unable to perform MMT d/t inability to follow commands   -KW     Row Name 10/09/18 0922          Sensory Assessment/Intervention    Sensory General Assessment no sensation deficits identified   light touch BLE assessed  -KW     Row Name 10/09/18 0922          Vision Assessment/Intervention    Visual Impairment/Limitations corrective lenses for reading  -KW     Row Name 10/09/18 0922          Pain Assessment    Additional Documentation Pain Scale: Numbers Pre/Post-Treatment (Group)  -     Row Name 10/09/18 0922          Pain Scale: Numbers Pre/Post-Treatment    Pain Scale: Numbers, Pretreatment 0/10 - no pain  -KW     Pain Scale: Numbers, Post-Treatment 0/10 - no pain  -     Row Name             Wound 10/08/18 1415 Bilateral coccyx    Wound - Properties Group Date first assessed: 10/08/18  -EB Time first assessed: 1415  -EB Present On Admission : no  -EB Side: Bilateral  -EB Location: coccyx  -EB    Row Name 10/09/18 0922          Plan of Care Review    Plan of Care Reviewed With patient  -KW     Row Name 10/09/18 0922          Physical Therapy Clinical Impression    Date of Referral to PT 10/08/18  -KW     PT Diagnosis (PT Clinical Impression) Impaired functional mobility, balance, gait, and endurance   -KW     Criteria for Skilled Interventions Met (PT Clinical Impression) yes;treatment indicated  -KW     Pathology/Pathophysiology Noted (Describe Specifically for Each System) musculoskeletal  -KW     Impairments Found (describe specific impairments) aerobic capacity/endurance;gait, locomotion, and balance;muscle performance  -KW     Functional Limitations in Following Categories (Describe Specific Limitations) self-care;home management;community/leisure  -KW     Disability: Inability to Perform Actions/Activities of Required Roles (describe specific  disability) community/leisure  -KW     Rehab Potential (PT Clinical Summary) fair, will monitor progress closely  -KW     Predicted Duration of Therapy (PT) until goals met or d/c from acute care  -KW     Care Plan Review (PT) evaluation/treatment results reviewed;risks/benefits reviewed;patient/other agree to care plan  -KW     Row Name 10/09/18 0922          Vital Signs    Pre Systolic BP Rehab 110  -KW     Pre Treatment Diastolic BP 55  -KW     Post Systolic BP Rehab 112  -KW     Post Treatment Diastolic BP 62  -KW     Pretreatment Heart Rate (beats/min) 73  -KW     Posttreatment Heart Rate (beats/min) 69  -KW     Pre SpO2 (%) 97  -KW     O2 Delivery Pre Treatment room air  -KW     Post SpO2 (%) 99  -KW     O2 Delivery Post Treatment room air  -KW     Pre Patient Position Supine  -KW     Intra Patient Position Sitting  -KW     Post Patient Position Supine  -KW     Row Name 10/09/18 0922          Physical Therapy Goals    Bed Mobility Goal Selection (PT) bed mobility, PT goal 1  -KW     Transfer Goal Selection (PT) transfer, PT goal 1  -KW     Gait Training Goal Selection (PT) gait training, PT goal 1  -KW     Strength Goal Selection (PT) strength, PT goal 1  -KW     Additional Documentation Strength Goal Selection (PT) (Row)  -KW     Row Name 10/09/18 0922          Bed Mobility Goal 1 (PT)    Activity/Assistive Device (Bed Mobility Goal 1, PT) rolling to left;rolling to right;sit to supine;supine to sit  -KW     [place] Level/Cues Needed (Bed Mobility Goal 1, PT) supervision required  -KW     Time Frame (Bed Mobility Goal 1, PT) 2 days  -KW     Progress/Outcomes (Bed Mobility Goal 1, PT) goal not met  -KW     Row Name 10/09/18 0922          Transfer Goal 1 (PT)    Activity/Assistive Device (Transfer Goal 1, PT) sit-to-stand/stand-to-sit;bed-to-chair/chair-to-bed;walker, rolling  -KW     [place] Level/Cues Needed (Transfer Goal 1, PT) supervision required  -KW     Time Frame (Transfer Goal 1, PT) 5 days   -KW     Progress/Outcome (Transfer Goal 1, PT) goal not met  -KW     Row Name 10/09/18 0922          Gait Training Goal 1 (PT)    Activity/Assistive Device (Gait Training Goal 1, PT) gait (walking locomotion);assistive device use;decrease fall risk;improve balance and speed;increase endurance/gait distance;walker, rolling  -KW     [place] Level (Gait Training Goal 1, PT) supervision required  -KW     Distance (Gait Goal 1, PT) 25 ft or more  -KW     Time Frame (Gait Training Goal 1, PT) 1 week  -KW     Progress/Outcome (Gait Training Goal 1, PT) goal not met  -KW     Row Name 10/09/18 0922          Strength Goal 1 (PT)    Strength Goal 1 (PT) Pt will tolerate 15 reps of at least 3 different LE exercises  -KW     Time Frame (Strength Goal 1, PT) 5 days  -KW     Progress/Outcome (Strength Goal 1, PT) goal not met  -KW     Row Name 10/09/18 0922          Positioning and Restraints    Pre-Treatment Position in bed  -KW     Post Treatment Position bed  -KW     In Bed notified nsg;sitting;call light within reach;encouraged to call for assist;exit alarm on;side rails up x2  -KW     Row Name 10/09/18 0922          Living Environment    Home Accessibility wheelchair accessible  -KW       User Key  (r) = Recorded By, (t) = Taken By, (c) = Cosigned By    Initials Name Provider Type    Joan Nielson, RN Registered Nurse    Holly Ordonez, PT Physical Therapist          Physical Therapy Education     Title: PT OT SLP Therapies (Active)     Topic: Physical Therapy (Active)     Point: Mobility training (Done)    Learning Progress Summary     Learner Status Readiness Method Response Comment Documented by    Patient Done Acceptance E VU Role of PT, POC, use of gait belt KW 10/09/18 1441          Point: Home exercise program (Done)    Learning Progress Summary     Learner Status Readiness Method Response Comment Documented by    Patient Done Acceptance E VU Role of PT, POC, use of gait belt KW 10/09/18 1441          Point:  Precautions (Done)    Learning Progress Summary     Learner Status Readiness Method Response Comment Documented by    Patient Done Acceptance E VU Role of PT, POC, use of gait belt  10/09/18 1441                      User Key     Initials Effective Dates Name Provider Type Discipline     07/23/18 -  Holly Cross, PT Physical Therapist PT                PT Recommendation and Plan  Anticipated Discharge Disposition (PT): skilled nursing facility  Planned Therapy Interventions (PT Eval): bed mobility training, balance training, gait training, patient/family education, ROM (range of motion), strengthening, stretching, transfer training  Therapy Frequency (PT Clinical Impression): other (see comments) (5-14x/week)  Outcome Summary/Treatment Plan (PT)  Anticipated Discharge Disposition (PT): skilled nursing facility  Plan of Care Reviewed With: patient  Outcome Summary: PT evaluation completed as co-eval with OT. Pt agreeable to eval, but confused at times throughout. Pt able to perform bed mobility with min-modA and sit<>stand with modA and FW walker. Pt attempted to take steps to HOB, but unsuccessful d/t LE weakness. Pt would benefit from further skilled PT to increase strength, balance, and functional mobility. Upon d/c from acute care, recommend return to residential facility.           Outcome Measures     Row Name 10/09/18 0923 10/09/18 0922          How much help from another person do you currently need...    Turning from your back to your side while in flat bed without using bedrails?  -- 3  -KW     Moving from lying on back to sitting on the side of a flat bed without bedrails?  -- 3  -KW     Moving to and from a bed to a chair (including a wheelchair)?  -- 2  -KW     Standing up from a chair using your arms (e.g., wheelchair, bedside chair)?  -- 3  -KW     Climbing 3-5 steps with a railing?  -- 2  -KW     To walk in hospital room?  -- 2  -KW     AM-PAC 6 Clicks Score  -- 15  -KW        How much help from  another is currently needed...    Putting on and taking off regular lower body clothing? 2  -BH  --     Bathing (including washing, rinsing, and drying) 2  -BH  --     Toileting (which includes using toilet bed pan or urinal) 1  -BH  --     Putting on and taking off regular upper body clothing 2  -BH  --     Taking care of personal grooming (such as brushing teeth) 2  -BH  --     Eating meals 3  -BH  --     Score 12  -BH  --        Functional Assessment    Outcome Measure Options AM-PAC 6 Clicks Daily Activity (OT)  - AM-PAC 6 Clicks Basic Mobility (PT)  -       User Key  (r) = Recorded By, (t) = Taken By, (c) = Cosigned By    Initials Name Provider Type     Maria Eugenia Greer, OTR/L Occupational Therapist    Holly Ordonez, PT Physical Therapist           Time Calculation:         PT Charges     Row Name 10/09/18 1505             Time Calculation    Start Time 0922  -KW      Stop Time 0952  -KW      Time Calculation (min) 30 min  -KW      PT Received On 10/09/18  -KW      PT Goal Re-Cert Due Date 10/22/18  -KW        User Key  (r) = Recorded By, (t) = Taken By, (c) = Cosigned By    Initials Name Provider Type    Holly Ordonez, DELORIS Physical Therapist        Therapy Suggested Charges     Code   Minutes Charges    None           Therapy Charges for Today     Code Description Service Date Service Provider Modifiers Qty    81431514324 HC PT MOBILITY CURRENT 10/9/2018 Holly Cross, PT GP, CK 1    39811448629 HC PT MOBILITY PROJECTED 10/9/2018 Holly Cross, PT GP, CI 1    [id number] HC PT EVAL MOD COMPLEXITY 2 10/9/2018 Holly Cross, PT GP 1          PT G-Codes  PT Professional Judgement Used?: Yes  Outcome Measure Options: AM-PAC 6 Clicks Daily Activity (OT)  AM-PAC 6 Clicks Score: 15  Score: 12  Functional Limitation: Mobility: Walking and moving around  Mobility: Walking and Moving Around Current Status (): At least 40 percent but less than 60 percent impaired, limited or restricted  Mobility: Walking  and Moving Around Goal Status (): At least 1 percent but less than 20 percent impaired, limited or restricted      Holly Cross, PT  10/9/2018

## 2018-10-09 NOTE — CONSULTS
Adult Nutrition  Assessment    Patient Name:  Kym Fraser  YOB: 1940  MRN: [id number]  Admit Date:  10/8/2018    Assessment Date:  10/9/2018    Comments:  Pt is a 78 year old female with hx of dementia admitted with AMS, weakness, and recent UTI with fall.  Pt does not give much history but family present reports poor appetite and wt loss.  Family voiced food preferences which RD will note.  Will send icecream and glucerna on trays to increase protein/tiffany intake.  Wt records indicate 9lb wt loss over past week and potential 60lb wt loss in less than 2 years.  Pt has vague GI complaint of 'burning' in her stomach which family reports she has complained of stomach pain for some time.  No difficulty chewing or swallowing reported.  RD will monitor.          Reason for Assessment     Row Name 10/09/18 1620          Reason for Assessment    Reason For Assessment identified at risk by screening criteria     Diagnosis neurologic conditions     Identified At Risk by Screening Criteria unintentional loss of 10 lbs or more in the past 2 mos;reduced oral intake over the last month               Nutrition/Diet History     Row Name 10/09/18 1620          Nutrition/Diet History    Typical Food/Fluid Intake Pt gives limited information.  Daughter present voiced some food preferences.  Pt complains of 'burning' in stomach.             Anthropometrics     Row Name 10/09/18 1621          Usual Body Weight (UBW)    Weight Loss unintentional     Weight Loss Time Frame 60+lb in <2 years; 9lb in 1 week             Labs/Tests/Procedures/Meds     Row Name 10/09/18 1621          Labs/Procedures/Meds    Lab Results Reviewed reviewed, pertinent     Lab Results Comments glu         Medications    Pertinent Medications Reviewed reviewed, pertinent               Estimated/Assessed Needs     Row Name 10/09/18 1622          Calculation Measurements    Weight Used For Calculations 63.1 kg (139 lb 1.8 oz)         Estimated/Assessed Needs    Additional Documentation KCAL/KG (Group);Fluid Requirements (Group);Protein Requirements (Group)        KCAL/KG    14 Kcal/Kg (kcal) 883.4     15 Kcal/Kg (kcal) 946.5     18 Kcal/Kg (kcal) 1135.8     20 Kcal/Kg (kcal) 1262     25 Kcal/Kg (kcal) 1577.5     30 Kcal/Kg (kcal) 1893     35 Kcal/Kg (kcal) 2208.5     40 Kcal/Kg (kcal) 2524     45 Kcal/Kg (kcal) 2839.5     50 Kcal/Kg (kcal) 3155     kcal/kg (Specify) 25        [place]-St. Jeor Equation    RMR ([place]-St. Jeor Equation) 1116.88        Protein Requirements    Est Protein Requirement Amount (gms/kg) 1.2 gm protein     Estimated Protein Requirements (gms/day) 75.72        Fluid Requirements    Estimated Fluid Requirements (mL/day) 1577     Estimated Fluid Requirement Method RDA Method     RDA Method (mL) 1577     [place]-Ritu Method (over 20 kg) 2762             Nutrition Prescription Ordered     Row Name 10/09/18 1622          Nutrition Prescription PO    Current PO Diet Regular     Common Modifiers Consistent Carbohydrate             Evaluation of Received Nutrient/Fluid Intake     Row Name 10/09/18 1623 10/09/18 1622       Calculation Measurements    Weight Used For Calculations  -- 63.1 kg (139 lb 1.8 oz)       PO Evaluation    Number of Days PO Intake Evaluated Insufficient Data  --            Evaluation of Prescribed Nutrient/Fluid Intake     Row Name 10/09/18 1622          Calculation Measurements    Weight Used For Calculations 63.1 kg (139 lb 1.8 oz)           Electronically signed by:  Brittany Odonnell RD  10/09/18 4:23 PM

## 2018-10-09 NOTE — THERAPY EVALUATION
Acute Care - Occupational Therapy Initial Evaluation  [facility]     Patient Name: Kym Fraser  : 1940  MRN: [id number]  Today's Date: 10/9/2018  Onset of Illness/Injury or Date of Surgery: 10/08/18  Date of Referral to OT: 10/08/18  Referring Physician: Dr. Gallego    Admit Date: 10/8/2018       ICD-10-CM ICD-9-CM   1. Altered mental status, unspecified altered mental status type R41.82 780.97   2. FTT (failure to thrive) in adult R62.7 783.7   3. Weight loss R63.4 783.21   4. Impaired functional mobility, balance, gait, and endurance Z74.09 V49.89   5. Impaired mobility and ADLs Z74.09 799.89     Patient Active Problem List   Diagnosis   • Vascular dementia without behavioral disturbance   • Type 2 diabetes mellitus with diabetic polyneuropathy, with long-term current use of insulin (CMS/[facility])   • Hypertension associated with diabetes (CMS/[facility])   • B12 deficiency   • Vitamin D deficiency   • Mixed diabetic hyperlipidemia associated with type 2 diabetes mellitus (CMS/[facility])   • Altered mental status   • Weight loss   • FTT (failure to thrive) in adult   • UTI (urinary tract infection)     Past Medical History:   Diagnosis Date   • Anxiety    • Asthma    • Dementia    • Depression    • Diabetes mellitus (CMS/[facility])    • GERD (gastroesophageal reflux disease)    • Hemorrhoids    • Hypertension    • Nervous breakdown    • Nervous breakdown      Past Surgical History:   Procedure Laterality Date   • EYE SURGERY      cataract   • HYSTERECTOMY     • MASTECTOMY      partial (pt has both breasts)   • POLYPECTOMY            OT ASSESSMENT FLOWSHEET (last 72 hours)      Occupational Therapy Evaluation     Row Name 10/09/18 0923                   OT Evaluation Time/Intention    Subjective Information no complaints  -        Document Type evaluation  -        Mode of Treatment co-treatment;occupational therapy;physical therapy  -        Total Evaluation Minutes, Occupational Therapy 29  -        Patient  Effort good  -           General Information    Patient Profile Reviewed? yes  -        Onset of Illness/Injury or Date of Surgery 10/08/18  -        Referring Physician Dr. Gallego  -        Patient Observations agree to therapy;cooperative   pt asleep but awoke  -        Patient/Family Observations no family present  -        General Observations of Patient pt supine HOB up on room air, tele, IV, bed alarm   -        Prior Level of Function --   assist w/ bath/dressing/toileting, cues for feeding/ A mobil   -        Equipment Currently Used at Home --   RW - SNF reported just gait belt for mobility  -        Pertinent History of Current Functional Problem Pt is a 77 yo female admitted d/t fall at nursing home and a UTI  -        Existing Precautions/Restrictions fall  -        Limitations/Impairments safety/cognitive  -        Risks Reviewed patient:;LOB;increased discomfort;change in vital signs  -        Benefits Reviewed patient:;improve function;increase independence;increase strength;increase balance  -           Relationship/Environment    Lives With facility resident  -        Concerns About Impact on Relationships SNF called to get PLOF as pt unable to give accurate information. SNF reported vc for self feeding, gait belt and assist with mobility with pt not understanding how to use RW. Pt could feed self with vc, incontinent mostly with toileting; assist with dressing and bathing. Pt required inconsistent assist.   -           Resource/Environmental Concerns    Current Living Arrangements residential facility  -           Cognitive Assessment/Interventions    Additional Documentation Cognitive Assessment/Intervention (Group)  -           Cognitive Assessment/Intervention- PT/OT    Affect/Mental Status (Cognitive) confused   mostly pleasant  -        Orientation Status (Cognition) oriented to;person   to BD confused on rest   -        Follows Commands (Cognition)  follows one step commands;50-74% accuracy;delayed response/completion;increased processing time needed;initiation impaired;physical/tactile prompts required;repetition of directions required;verbal cues/prompting required;visual queue  -        Cognitive Function (Cognitive) safety deficit;memory deficit  -        Memory Deficit (Cognitive) moderate deficit;mild deficit  -        Safety Deficit (Cognitive) mild deficit;moderate deficit  -        Personal Safety Interventions fall prevention program maintained;gait belt;nonskid shoes/slippers when out of bed;supervised activity  -           Safety Issues, Functional Mobility    Safety Issues Affecting Function (Mobility) ability to follow commands;at risk behavior observed;awareness of need for assistance;impulsivity;insight into deficits/self awareness;judgment;problem solving;safety precaution awareness;safety precautions follow-through/compliance;sequencing abilities  -        Impairments Affecting Function (Mobility) balance;cognition;coordination;endurance/activity tolerance;motor control;motor planning;postural/trunk control;strength  -           Bed Mobility Assessment/Treatment    Bed Mobility Assessment/Treatment supine-sit;sit-supine  -        Supine-Sit [place] (Bed Mobility) moderate assist (50% patient effort)  [facility]        Sit-Supine [place] (Bed Mobility) moderate assist (50% patient effort)  [facility]        Bed Mobility, Safety Issues decreased use of arms for pushing/pulling;decreased use of legs for bridging/pushing;cognitive deficits limit understanding  -        Assistive Device (Bed Mobility) bed rails;head of bed elevated  -        Comment (Bed Mobility) pt struggled with sequencing and direction following.   -           Functional Mobility    Functional Mobility- Comment attempted a step pt unable with two assist this date to follow sequence and vc and directions.   -           Transfer Assessment/Treatment     Transfer Assessment/Treatment sit-stand transfer;stand-sit transfer  -           Sit-Stand Transfer    Sit-Stand [place] (Transfers) moderate assist (50% patient effort)  -        Assistive Device (Sit-Stand Transfers) walker, front-wheeled  -           Stand-Sit Transfer    Stand-Sit [place] (Transfers) moderate assist (50% patient effort)  -        Assistive Device (Stand-Sit Transfers) walker, front-wheeled  -           ADL Assessment/Intervention    BADL Assessment/Intervention toileting;feeding;lower body dressing  -           Lower Body Dressing Assessment/Training    Lower Body Dressing [place] Level don;socks;set up;verbal cues;supervision   SBA  -        Lower Body Dressing Position sitting up in bed  -           Self-Feeding Assessment/Training    Comment (Feeding) pt set up assist for a bowel of cereal able to get to her mouth, no spillage while OT in room, OT did not stay entire time. towel placed on chest for safey/in case of spillage.   -           Toileting Assessment/Training    Comment (Toileting) pt had incontinent episode and pt assisted in standing requried support and dependent to get cleaned up.   -           BADL Safety/Performance    Impairments, BADL Safety/Performance balance;cognition;endurance/activity tolerance;coordination;motor control;motor planning;trunk/postural control;strength  -           General ROM    GENERAL ROM COMMENTS grossly BUE WFL, unable to follow directions for digit to thumb  -           MMT (Manual Muscle Testing)    General MMT Comments BUE  grossly 4-/5 BUE grossly 4-/5  -           Sensory Assessment/Intervention    Additional Documentation Hearing Assessment (Group);Vision Assessment/Intervention (Group)  -           Light Touch Sensation Assessment    Comment, Left Upper Extremity: Light Touch Sensation Assessment pt unable to follow directions/comprehension to assess accurately  -        Comment, Right Upper  Extremity: Light Touch Sensation Assessment pt unable to follow directions/comprehension to assess accurately  -           Vision Assessment/Intervention    Visual Impairment/Limitations corrective lenses for reading  -           Positioning and Restraints    Pre-Treatment Position in bed  -        Post Treatment Position bed  -        In Bed notified nsg;supine;call light within reach;encouraged to call for assist;exit alarm on;side rails up x2  -BH           Pain Assessment    Additional Documentation Pain Scale: Numbers Pre/Post-Treatment (Group)  -           Pain Scale: Numbers Pre/Post-Treatment    Pain Scale: Numbers, Pretreatment 0/10 - no pain  -        Pain Scale: Numbers, Post-Treatment 0/10 - no pain  -           Wound 10/08/18 1415 Bilateral coccyx    Wound - Properties Group Date first assessed: 10/08/18  -EB Time first assessed: 1415  -EB Present On Admission : no  -EB Side: Bilateral  -EB Location: coccyx  -EB       Plan of Care Review    Plan of Care Reviewed With patient  -           Clinical Impression (OT)    Date of Referral to OT 10/08/18  -        OT Diagnosis Impaired mobility and ADL  -        Prognosis (OT Eval) fair  -        Functional Level at Time of Evaluation (OT Eval) Pt fatigued with tasks, decreased independence with functional tasks.   -        Criteria for Skilled Therapeutic Interventions Met (OT Eval) yes;treatment indicated  -        Rehab Potential (OT Eval) fair, will monitor progress closely  -        Therapy Frequency (OT Eval) other (see comments)   5-7 days a week  -        Predicted Duration of Therapy Intervention (Therapy Eval) until d/c  -        Care Plan Review (OT) evaluation/treatment results reviewed;care plan/treatment goals reviewed;patient/other agree to care plan  -        Anticipated Discharge Disposition (OT) skilled nursing facility  -           Vital Signs    Pre Systolic BP Rehab 110  -BH        Pre Treatment Diastolic  BP 55  -BH        Post Systolic BP Rehab 112  -BH        Post Treatment Diastolic BP 58  -BH        Pretreatment Heart Rate (beats/min) 73  -BH        Posttreatment Heart Rate (beats/min) 69  -BH        Pre SpO2 (%) 97  -BH        O2 Delivery Pre Treatment room air  -BH        Post SpO2 (%) 99  -BH        O2 Delivery Post Treatment room air  -BH        Pre Patient Position Supine  -BH        Intra Patient Position Sitting  -BH        Post Patient Position Supine  -BH           Planned OT Interventions    Planned Therapy Interventions (OT Eval) activity tolerance training;adaptive equipment training;BADL retraining;cognitive/visual perception retraining;functional balance retraining;occupation/activity based interventions;patient/caregiver education/training;passive ROM/stretching;strengthening exercise;ROM/therapeutic exercise;transfer/mobility retraining  -           OT Goals    Transfer Goal Selection (OT) transfer, OT goal 1  -        Bathing Goal Selection (OT) bathing, OT goal 1  -        Dressing Goal Selection (OT) dressing, OT goal 1  -        Grooming Goal Selection (OT) grooming, OT goal 1  -        Additional Documentation Grooming Goal Selection (OT) (Row)  -           Transfer Goal 1 (OT)    Activity/Assistive Device (Transfer Goal 1, OT) toilet   BSC as needed  -        [place] Level/Cues Needed (Transfer Goal 1, OT) moderate assist (50-74% patient effort)  -        Time Frame (Transfer Goal 1, OT) long term goal (LTG);by discharge  -        Progress/Outcome (Transfer Goal 1, OT) goal not met  -           Bathing Goal 1 (OT)    Activity/Assistive Device (Bathing Goal 1, OT) upper body bathing  -        [place] Level/Cues Needed (Bathing Goal 1, OT) minimum assist (75% or more patient effort)  -        Time Frame (Bathing Goal 1, OT) long term goal (LTG);by discharge  -        Progress/Outcomes (Bathing Goal 1, OT) goal not met  -           Dressing Goal 1 (OT)     Activity/Assistive Device (Dressing Goal 1, OT) dressing skills, all  -        [place]/Cues Needed (Dressing Goal 1, OT) minimum assist (75% or more patient effort)  -        Time Frame (Dressing Goal 1, OT) long term goal (LTG);by discharge  -        Progress/Outcome (Dressing Goal 1, OT) goal not met  -           Grooming Goal 1 (OT)    Activity/Device (Grooming Goal 1, OT) grooming skills, all  -        [place] (Grooming Goal 1, OT) set-up required;verbal cues required;standby assist  -        Time Frame (Grooming Goal 1, OT) long term goal (LTG);by discharge  -        Progress/Outcome (Grooming Goal 1, OT) goal not met  -           Living Environment    Home Accessibility wheelchair accessible  -          User Key  (r) = Recorded By, (t) = Taken By, (c) = Cosigned By    Initials Name Effective Dates     Maria Eugenia Greer, OTR/L 06/08/18 -     Joan Nielson RN 10/17/16 -            Occupational Therapy Education     Title: PT OT SLP Therapies (Active)     Topic: Occupational Therapy (Active)     Point: ADL training (Active)     Description: Instruct learner(s) on proper safety adaptation and remediation techniques during self care or transfers.   Instruct in proper use of assistive devices.   Learning Progress Summary     Learner Status Readiness Method Response Comment Documented by    Patient Active Acceptance E NR Educated about OT and POC. Educated on safety during tasks. Educated to call for assist.  10/09/18 1505                      User Key     Initials Effective Dates Name Provider Type Discipline     06/08/18 -  Maria Eugenia Greer OTR/L Occupational Therapist OT                  OT Recommendation and Plan  Outcome Summary/Treatment Plan (OT)  Anticipated Discharge Disposition (OT): skilled nursing facility  Planned Therapy Interventions (OT Eval): activity tolerance training, adaptive equipment training, BADL retraining, cognitive/visual perception retraining,  functional balance retraining, occupation/activity based interventions, patient/caregiver education/training, passive ROM/stretching, strengthening exercise, ROM/therapeutic exercise, transfer/mobility retraining  Therapy Frequency (OT Eval): other (see comments) (5-7 days a week)  Plan of Care Review  Plan of Care Reviewed With: patient  Plan of Care Reviewed With: patient  Outcome Summary: OT eval completed this date co-eval with PT. Pt agreeable but confused and unable to get accurate information from pt at times. Pt required mod assist for sit to sup to sit. Pt was mod assist for sit to stand, Pt dependent for toileting after incontinenet. Pt set up assist for a bowel of cereal. Pt unable to tolerate side steppign with assist of 2. Pt could benefit from further skilled OT to reach maximum level fo independence with ADL. Recommend to d/c to SNF with further therapy.           Outcome Measures     Row Name 10/09/18 0923 10/09/18 0922          How much help from another person do you currently need...    Turning from your back to your side while in flat bed without using bedrails?  -- 3  -KW     Moving from lying on back to sitting on the side of a flat bed without bedrails?  -- 3  -KW     Moving to and from a bed to a chair (including a wheelchair)?  -- 2  -KW     Standing up from a chair using your arms (e.g., wheelchair, bedside chair)?  -- 3  -KW     Climbing 3-5 steps with a railing?  -- 2  -KW     To walk in hospital room?  -- 2  -KW     AM-PAC 6 Clicks Score  -- 15  -KW        How much help from another is currently needed...    Putting on and taking off regular lower body clothing? 2  -BH  --     Bathing (including washing, rinsing, and drying) 2  -BH  --     Toileting (which includes using toilet bed pan or urinal) 1  -BH  --     Putting on and taking off regular upper body clothing 2  -BH  --     Taking care of personal grooming (such as brushing teeth) 2  -BH  --     Eating meals 3  -BH  --     Score 12   -  --        Functional Assessment    Outcome Measure Options AM-PAC 6 Clicks Daily Activity (OT)  - AM-PAC 6 Clicks Basic Mobility (PT)  -       User Key  (r) = Recorded By, (t) = Taken By, (c) = Cosigned By    Initials Name Provider Type     Maria Eugenia Greer, OTR/L Occupational Therapist    KW [place], PT Physical Therapist          Time Calculation:         Time Calculation- OT     Row Name 10/09/18 1508             Time Calculation-     OT Start Time 0923  -      OT Stop Time 0952  -      OT Time Calculation (min) 29 min  -      OT Received On 10/09/18  -      OT Goal Re-Cert Due Date 10/22/18  -        User Key  (r) = Recorded By, (t) = Taken By, (c) = Cosigned By    Initials Name Provider Type     Marai Eugenia Greer, OTR/L Occupational Therapist        Therapy Suggested Charges     Code   Minutes Charges    None           Therapy Charges for Today     Code Description Service Date Service Provider Modifiers Qty    64216461102  OT SELFCARE CURRENT 10/9/2018 Maria Eugenia Greer OTR/L GO, CL 1    [id number]  OT SELFCARE PROJECTED 10/9/2018 Maria Eugenia Greer OTR/L GO, CK 1    [id number]  OT EVAL MOD COMPLEXITY 2 10/9/2018 Maria Eugenia Greer OTR/L GO 1          OT G-codes  OT Professional Judgement Used?: Yes  OT Functional Scales Options: AM-PAC 6 Clicks Daily Activity (OT)  Score: 12  Functional Limitation: Self care  Self Care Current Status (): At least 60 percent but less than 80 percent impaired, limited or restricted  Self Care Goal Status (): At least 40 percent but less than 60 percent impaired, limited or restricted    Maria Eugenia Greer OTR/MARGUERITE  10/9/2018

## 2018-10-10 VITALS
DIASTOLIC BLOOD PRESSURE: 70 MMHG | HEART RATE: 73 BPM | RESPIRATION RATE: 18 BRPM | HEIGHT: 65 IN | BODY MASS INDEX: 23.19 KG/M2 | OXYGEN SATURATION: 96 % | WEIGHT: 139.2 LBS | TEMPERATURE: 97.5 F | SYSTOLIC BLOOD PRESSURE: 132 MMHG

## 2018-10-10 LAB
GLUCOSE BLDC GLUCOMTR-MCNC: 110 MG/DL (ref 70–130)
GLUCOSE BLDC GLUCOMTR-MCNC: 119 MG/DL (ref 70–130)
GLUCOSE BLDC GLUCOMTR-MCNC: 86 MG/DL (ref 70–130)

## 2018-10-10 PROCEDURE — 97535 SELF CARE MNGMENT TRAINING: CPT

## 2018-10-10 PROCEDURE — 82962 GLUCOSE BLOOD TEST: CPT

## 2018-10-10 PROCEDURE — 97530 THERAPEUTIC ACTIVITIES: CPT

## 2018-10-10 PROCEDURE — 25010000002 CEFTRIAXONE PER 250 MG: Performed by: INTERNAL MEDICINE

## 2018-10-10 PROCEDURE — G0378 HOSPITAL OBSERVATION PER HR: HCPCS

## 2018-10-10 PROCEDURE — 96361 HYDRATE IV INFUSION ADD-ON: CPT

## 2018-10-10 PROCEDURE — 97110 THERAPEUTIC EXERCISES: CPT

## 2018-10-10 RX ORDER — CEFUROXIME AXETIL 500 MG/1
500 TABLET ORAL 2 TIMES DAILY
Qty: 4 TABLET | Refills: 0 | Status: SHIPPED | OUTPATIENT
Start: 2018-10-11

## 2018-10-10 RX ADMIN — SODIUM CHLORIDE 100 ML/HR: 9 INJECTION, SOLUTION INTRAVENOUS at 08:45

## 2018-10-10 RX ADMIN — FLUOXETINE 20 MG: 20 CAPSULE ORAL at 08:41

## 2018-10-10 RX ADMIN — AMLODIPINE BESYLATE 5 MG: 5 TABLET ORAL at 08:41

## 2018-10-10 RX ADMIN — FAMOTIDINE 20 MG: 20 TABLET ORAL at 08:41

## 2018-10-10 RX ADMIN — DORZOLAMIDE HYDROCHLORIDE AND TIMOLOL MALEATE 1 DROP: 20; 5 SOLUTION/ DROPS OPHTHALMIC at 08:41

## 2018-10-10 RX ADMIN — ASPIRIN 81 MG CHEWABLE TABLET 81 MG: 81 TABLET CHEWABLE at 08:45

## 2018-10-10 RX ADMIN — LISINOPRIL 20 MG: 20 TABLET ORAL at 08:41

## 2018-10-10 RX ADMIN — CEFTRIAXONE SODIUM 1 G: 1 INJECTION, POWDER, FOR SOLUTION INTRAMUSCULAR; INTRAVENOUS at 14:27

## 2018-10-10 RX ADMIN — METFORMIN HYDROCHLORIDE 500 MG: 500 TABLET, FILM COATED ORAL at 08:41

## 2018-10-10 RX ADMIN — MEMANTINE 10 MG: 5 TABLET ORAL at 08:41

## 2018-10-10 RX ADMIN — ATORVASTATIN CALCIUM 10 MG: 10 TABLET, FILM COATED ORAL at 08:41

## 2018-10-10 NOTE — PLAN OF CARE
Problem: Patient Care Overview  Goal: Plan of Care Review  Outcome: Ongoing (interventions implemented as appropriate)   10/10/18 3916   Coping/Psychosocial   Plan of Care Reviewed With patient   Plan of Care Review   Progress no change   OTHER   Outcome Summary pt rested well during the night     Goal: Individualization and Mutuality  Outcome: Ongoing (interventions implemented as appropriate)    Goal: Discharge Needs Assessment  Outcome: Ongoing (interventions implemented as appropriate)      Problem: Skin Injury Risk (Adult)  Goal: Identify Related Risk Factors and Signs and Symptoms  Outcome: Outcome(s) achieved Date Met: 10/10/18    Goal: Skin Health and Integrity  Outcome: Ongoing (interventions implemented as appropriate)      Problem: Urinary Tract Infection (Adult)  Goal: Signs and Symptoms of Listed Potential Problems Will be Absent, Minimized or Managed (Urinary Tract Infection)  Outcome: Ongoing (interventions implemented as appropriate)

## 2018-10-10 NOTE — THERAPY TREATMENT NOTE
Acute Care - Occupational Therapy Treatment Note  [facility]     Patient Name: Kym Fraser  : 1940  MRN: [id number]  Today's Date: 10/10/2018  Onset of Illness/Injury or Date of Surgery: 10/08/18  Date of Referral to OT: 10/08/18  Referring Physician: Dr. Gallego    Admit Date: 10/8/2018       ICD-10-CM ICD-9-CM   1. Altered mental status, unspecified altered mental status type R41.82 780.97   2. FTT (failure to thrive) in adult R62.7 783.7   3. Weight loss R63.4 783.21   4. Impaired functional mobility, balance, gait, and endurance Z74.09 V49.89   5. Impaired mobility and ADLs Z74.09 799.89     Patient Active Problem List   Diagnosis   • Vascular dementia without behavioral disturbance   • Type 2 diabetes mellitus with diabetic polyneuropathy, with long-term current use of insulin (CMS/[facility])   • Hypertension associated with diabetes (CMS/[facility])   • B12 deficiency   • Vitamin D deficiency   • Mixed diabetic hyperlipidemia associated with type 2 diabetes mellitus (CMS/HCC)   • Altered mental status   • Weight loss   • FTT (failure to thrive) in adult   • UTI (urinary tract infection)     Past Medical History:   Diagnosis Date   • Anxiety    • Asthma    • Dementia    • Depression    • Diabetes mellitus (CMS/HCC)    • GERD (gastroesophageal reflux disease)    • Hemorrhoids    • Hypertension    • Nervous breakdown    • Nervous breakdown      Past Surgical History:   Procedure Laterality Date   • EYE SURGERY      cataract   • HYSTERECTOMY     • MASTECTOMY      partial (pt has both breasts)   • POLYPECTOMY         Therapy Treatment          Rehabilitation Treatment Summary     Row Name 10/10/18 1020 10/10/18 0925          Treatment Time/Intention    Discipline occupational therapy assistant  -BL physical therapy assistant  -JA     Document Type therapy note (daily note)  -BL therapy note (daily note)  -JA     Subjective Information no complaints  -CHET complains of;fatigue  -JA     Mode of Treatment  occupational therapy;individual therapy  -BL physical therapy;individual therapy  -JA     Patient/Family Observations Pt's daughter and son in law in room upon entry pt up on bedside chair  -BL Pt. lying supine with no family present at beginning of treatment, but arrived at end of treatment   -JA     Therapy Frequency (PT Clinical Impression)  -- other (see comments)   5-14x/week  -JA     Therapy Frequency (OT Eval) other (see comments)   5-7x a week  -BL  --     Patient Effort good  -BL good  -JA     Existing Precautions/Restrictions fall  -BL fall  -JA     Recorded by [BL] Irina Collazo URRUTIA/L 10/10/18 1158 [JA] Nayan Dotson, PTA 10/10/18 1056     Row Name 10/10/18 1020 10/10/18 0925          Vital Signs    Pre Systolic BP Rehab  -- 154  -JA     Pre Treatment Diastolic BP  -- 72  -JA     Post Systolic BP Rehab  -- 144  -JA     Post Treatment Diastolic BP  -- 66  -JA     Pretreatment Heart Rate (beats/min) 71  -BL 68  -JA     Posttreatment Heart Rate (beats/min) 69  -BL 67  -JA     Pre SpO2 (%) 98  -BL  --     O2 Delivery Pre Treatment room air  -BL  --     Post SpO2 (%) 97  -BL  --     O2 Delivery Post Treatment room air  -BL  --     Pre Patient Position Sitting  -BL Supine  -JA     Intra Patient Position Standing  -BL Standing  -JA     Post Patient Position Supine  -BL Sitting  -JA     Recorded by [BL] Irina Collazo URRUTIA/L 10/10/18 1158 [JA] Nayan Dotson, PTA 10/10/18 1056     Row Name 10/10/18 1020 10/10/18 0925          Cognitive Assessment/Intervention- PT/OT    Affect/Mental Status (Cognitive) confused  -BL confused  -JA     Orientation Status (Cognition) oriented to;person  -BL oriented to;person  -JA     Follows Commands (Cognition) follows one step commands;50-74% accuracy;delayed response/completion;increased processing time needed;initiation impaired;physical/tactile prompts required;verbal cues/prompting required;visual queue  -BL 50-74% accuracy;delayed response/completion;increased  processing time needed;verbal cues/prompting required;repetition of directions required  -JA     Cognitive Function (Cognitive) safety deficit;memory deficit  -BL memory deficit  -JA     Memory Deficit (Cognitive) moderate deficit  -BL moderate deficit;mild deficit  -JA     Safety Deficit (Cognitive) ability to follow commands;awareness of need for assistance;insight into deficits/self awareness;problem solving;safety precautions awareness;safety precautions follow-through/compliance;judgment  -BL  --     Personal Safety Interventions gait belt;supervised activity;nonskid shoes/slippers when out of bed;fall prevention program maintained  -BL  --     Recorded by [BL] Irina Collazo COTA/MARGUERITE 10/10/18 1158 [JA] Nayan Dotson, PTA 10/10/18 1056     Row Name 10/10/18 1020 10/10/18 0935          Safety Issues, Functional Mobility    Safety Issues Affecting Function (Mobility) ability to follow commands;awareness of need for assistance;insight into deficits/self awareness;positioning of assistive device;problem solving;safety precaution awareness;safety precautions follow-through/compliance;sequencing abilities  -BL  --     Impairments Affecting Function (Mobility) balance;cognition;coordination;endurance/activity tolerance;strength  -BL balance;cognition;endurance/activity tolerance;strength  -JA     Recorded by [BL] Irina Collazo COTA/MARGUERITE 10/10/18 1158 [JA] Nayan Dotson, PTA 10/10/18 1056     Row Name 10/10/18 1020 10/10/18 0998          Bed Mobility Assessment/Treatment    Bed Mobility Assessment/Treatment sit-supine  -BL supine-sit  -JA     Supine-Sit [place] (Bed Mobility)  -- supervision  -JA     Sit-Supine [place] (Bed Mobility) supervision;verbal cues  -BL  --     Assistive Device (Bed Mobility) head of bed elevated;bed rails  -BL bed rails;head of bed elevated  -JA     Recorded by [BL] Irina Collazo COTA/MARGUERITE 10/10/18 1158 [JA] Nayan Dotson, PTA 10/10/18 1056     Row Name 10/10/18 1025  10/10/18 0925          Transfer Assessment/Treatment    Transfer Assessment/Treatment sit-stand transfer;stand-sit transfer;chair-bed transfer  -BL sit-stand transfer;stand-sit transfer  -JA     Recorded by [BL] Irina Collazo COTA/L 10/10/18 1158 [JA] Nayan Dotson, PTA 10/10/18 1056     Row Name 10/10/18 0925             Bed-Chair Transfer    Bed-Chair [place] (Transfers) minimum assist (75% patient effort)  -JA      Assistive Device (Bed-Chair Transfers) walker, front-wheeled  -JA      Recorded by [JA] Nayan Dotson, PTA 10/10/18 1056      Row Name 10/10/18 1020             Chair-Bed Transfer    Chair-Bed [place] (Transfers) minimum assist (75% patient effort)  -BL      Assistive Device (Chair-Bed Transfers) walker, front-wheeled  -BL      Recorded by [BL] Irina Collazo URRUTIA/L 10/10/18 1158      Row Name 10/10/18 1020 10/10/18 0925          Sit-Stand Transfer    Sit-Stand [place] (Transfers) minimum assist (75% patient effort)  -BL minimum assist (75% patient effort)  -JA     Assistive Device (Sit-Stand Transfers) walker, front-wheeled  -BL walker, front-wheeled  -JA     Recorded by [BL] Irina Collazo URRUTIA/MARGUERITE 10/10/18 1158 [JA] Nayan Dotson, PTA 10/10/18 1056     Row Name 10/10/18 1020 10/10/18 0925          Stand-Sit Transfer    Stand-Sit [place] (Transfers) minimum assist (75% patient effort)  -BL minimum assist (75% patient effort)  -JA     Assistive Device (Stand-Sit Transfers) walker, front-wheeled  -BL walker, front-wheeled  -JA     Recorded by [BL] Irina Collazo URRUTIA/L 10/10/18 1158 [JA] Nayan Dotson, PTA 10/10/18 1056     Row Name 10/10/18 1020             ADL Assessment/Intervention    BADL Assessment/Intervention toileting  -BL      Recorded by [BL] Irina Collazo URRUTIA/L 10/10/18 1158      Row Name 10/10/18 1020             Toileting Assessment/Training    [place] Level (Toileting) toileting skills;perform perineal hygiene;maximum assist (25%  patient effort);set up;verbal cues  -BL      Toileting Position supine  -BL      Comment (Toileting) Incontinent episode this date requiring max assist to perform pericare due to decreased sequencing skills and inability to follow commands  -BL      Recorded by [BL] Irina Collazo COTA/L 10/10/18 1158      Row Name 10/10/18 1021             BADL Safety/Performance    Impairments, BADL Safety/Performance balance;cognition;endurance/activity tolerance;strength  -BL      Cognitive Impairments, BADL Safety/Performance attention;awareness, need for assistance;insight into deficits/self awareness;judgment;problem solving/reasoning;safety precaution awareness;safety precaution follow-through;sequencing abilities  -BL      Skilled BADL Treatment/Intervention compensatory training  -BL      Progress in BADL Status level of supervision required;use of environmental adaptations  -BL      Recorded by [BL] Irina Collazo COTA/L 10/10/18 1158      Row Name 10/10/18 0944             Lower Extremity Seated Therapeutic Exercise    Performed, Seated Lower Extremity (Therapeutic Exercise) LAQ (long arc quad), knee extension;ankle dorsiflexion/plantarflexion;hip flexion/extension  -JA      Exercise Type, Seated Lower Extremity (Therapeutic Exercise) AROM (active range of motion)  -JA      Sets/Reps Detail, Seated Lower Extremity (Therapeutic Exercise) x15  -JA      Recorded by [JA] Nayan Dotson PTA 10/10/18 1056      Row Name 10/10/18 0953             Positioning and Restraints    Pre-Treatment Position in bed  -JA      Post Treatment Position chair  -JA      In Chair sitting;call light within reach;encouraged to call for assist;with family/caregiver  -JA      Recorded by [JA] Nayan Dotson PTA 10/10/18 1056      Row Name 10/10/18 1023             Pain Assessment    Additional Documentation Pain Scale: Numbers Pre/Post-Treatment (Group)  -BL      Recorded by [BL] Irina Collazo COTA/L 10/10/18 1155      Row Name  10/10/18 1020 10/10/18 0925          Pain Scale: Numbers Pre/Post-Treatment    Pain Scale: Numbers, Pretreatment 2/10  -BL 0/10 - no pain  -JA     Pain Scale: Numbers, Post-Treatment 2/10  -BL 0/10 - no pain  -JA     Pain Location abdomen  -BL  --     Pain Intervention(s) Repositioned  -BL  --     Recorded by [BL] Irina Collazo URRUTIA/L 10/10/18 1158 [JA] Nayan Dotson, PTA 10/10/18 1056     Row Name 10/10/18 0925             Sensory Assessment/Intervention    Sensory General Assessment --  -JA      Recorded by [JA] Nayan Dotson, PTA 10/10/18 1056      Row Name 10/10/18 0925             Vision Assessment/Intervention    Visual Impairment/Limitations --  -JA      Recorded by [JA] Nayan Dotson, PTA 10/10/18 1056      Row Name                Wound 10/08/18 1415 Bilateral coccyx    Wound - Properties Group Date first assessed: 10/08/18 [EB] Time first assessed: 1415 [EB] Present On Admission : no [EB] Side: Bilateral [EB] Location: coccyx [EB] Recorded by:  [EB] Joan Bauer RN 10/08/18 1526    Row Name 10/10/18 1020             Outcome Summary/Treatment Plan (OT)    Daily Summary of Progress (OT) progress toward functional goals is gradual  -BL      Plan for Continued Treatment (OT) continue with current POC  -BL      Recorded by [BL] Irina Collazo URRUTIA/L 10/10/18 1158      Row Name 10/10/18 0925             Outcome Summary/Treatment Plan (PT)    Daily Summary of Progress (PT) progress toward functional goals as expected  -JA      Plan for Continued Treatment (PT) short distance gt. as pt. able   -JA      Anticipated Discharge Disposition (PT) skilled nursing facility  -JA      Recorded by [JA] Nayan Dotson, PTA 10/10/18 1056        User Key  (r) = Recorded By, (t) = Taken By, (c) = Cosigned By    Initials Name Effective Dates Discipline    EB Joan Bauer RN 10/17/16 -  Nurse    JA Nayan Dotson, PTA 03/07/18 -  PT    BL Vale, DESHAWN Sanchez 10/17/16 -  OT        Wound 10/08/18  1415 Bilateral coccyx (Active)   Dressing Appearance open to air 10/9/2018  8:15 PM   Base pink 10/9/2018  8:15 PM             OT Rehab Goals     Row Name 10/10/18 1020             Transfer Goal 1 (OT)    Activity/Assistive Device (Transfer Goal 1, OT) toilet   BSC as needed  -BL      [place] Level/Cues Needed (Transfer Goal 1, OT) moderate assist (50-74% patient effort)  -BL      Time Frame (Transfer Goal 1, OT) long term goal (LTG);by discharge  -BL      Progress/Outcome (Transfer Goal 1, OT) goal not met  -BL         Bathing Goal 1 (OT)    Activity/Assistive Device (Bathing Goal 1, OT) upper body bathing  -BL      [place] Level/Cues Needed (Bathing Goal 1, OT) minimum assist (75% or more patient effort)  -BL      Time Frame (Bathing Goal 1, OT) long term goal (LTG);by discharge  -BL      Progress/Outcomes (Bathing Goal 1, OT) goal not met  -BL         Dressing Goal 1 (OT)    Activity/Assistive Device (Dressing Goal 1, OT) dressing skills, all  -BL      [place]/Cues Needed (Dressing Goal 1, OT) minimum assist (75% or more patient effort)  -BL      Time Frame (Dressing Goal 1, OT) long term goal (LTG);by discharge  -BL      Progress/Outcome (Dressing Goal 1, OT) goal not met  -BL         Grooming Goal 1 (OT)    Activity/Device (Grooming Goal 1, OT) grooming skills, all  -BL      [place] (Grooming Goal 1, OT) set-up required;verbal cues required;standby assist  -BL      Time Frame (Grooming Goal 1, OT) long term goal (LTG);by discharge  -BL      Progress/Outcome (Grooming Goal 1, OT) goal not met  -BL        User Key  (r) = Recorded By, (t) = Taken By, (c) = Cosigned By    Initials Name Provider Type Discipline    BL Irina Collazo, GHADA/L Occupational Therapy Assistant OT        Occupational Therapy Education     Title: PT OT SLP Therapies (Active)     Topic: Occupational Therapy (Active)     Point: ADL training (Active)     Description: Instruct learner(s) on proper safety adaptation and  remediation techniques during self care or transfers.   Instruct in proper use of assistive devices.   Learning Progress Summary     Learner Status Readiness Method Response Comment Documented by    Patient Active Acceptance E NR Educated about OT and POC. Educated on safety during tasks. Educated to call for assist.  10/09/18 1069                      User Key     Initials Effective Dates Name Provider Type Discipline     06/08/18 -  Maria Eugenia Greer, OTR/L Occupational Therapist OT                OT Recommendation and Plan  Outcome Summary/Treatment Plan (OT)  Daily Summary of Progress (OT): progress toward functional goals is gradual  Plan for Continued Treatment (OT): continue with current POC  Therapy Frequency (OT Eval): other (see comments) (5-7x a week)  Daily Summary of Progress (OT): progress toward functional goals is gradual  Plan of Care Review  Plan of Care Reviewed With: patient  Plan of Care Reviewed With: patient  Outcome Summary: Pt participated fair this date with moderate vc's for sequencing task however was able to transfer with min assist for sit to stand, chair to bed, and required supervision for sit to supine into bed. Pt required max assist for pericare this date with incontinent episode this date. No goals met and making gradual progress towards unmet goals. Pt will remain appropriate for skilled OT to increase ADL independence and overall safety.        Outcome Measures     Row Name 10/10/18 1020 10/10/18 0925 10/09/18 0923       How much help from another person do you currently need...    Turning from your back to your side while in flat bed without using bedrails?  -- 3  -JA  --    Moving from lying on back to sitting on the side of a flat bed without bedrails?  -- 3  -JA  --    Moving to and from a bed to a chair (including a wheelchair)?  -- 3  -JA  --    Standing up from a chair using your arms (e.g., wheelchair, bedside chair)?  -- 3  -JA  --    Climbing 3-5 steps with a  railing?  -- 2  -JA  --    To walk in hospital room?  -- 2  -JA  --    AM-PAC 6 Clicks Score  -- 16  -JA  --       How much help from another is currently needed...    Putting on and taking off regular lower body clothing? 2  -BL  -- 2  -BH    Bathing (including washing, rinsing, and drying) 2  -BL  -- 2  -BH    Toileting (which includes using toilet bed pan or urinal) 2  -BL  -- 1  -BH    Putting on and taking off regular upper body clothing 2  -BL  -- 2  -BH    Taking care of personal grooming (such as brushing teeth) 2  -BL  -- 2  -BH    Eating meals 3  -BL  -- 3  -BH    Score 13  -BL  -- 12  -BH       Functional Assessment    Outcome Measure Options AM-PAC 6 Clicks Daily Activity (OT)  - AM-PAC 6 Clicks Basic Mobility (PT)  - AM-[facility] 6 Clicks Daily Activity (OT)  -    Row Name 10/09/18 0922             How much help from another person do you currently need...    Turning from your back to your side while in flat bed without using bedrails? 3  -KW      Moving from lying on back to sitting on the side of a flat bed without bedrails? 3  -KW      Moving to and from a bed to a chair (including a wheelchair)? 2  -KW      Standing up from a chair using your arms (e.g., wheelchair, bedside chair)? 3  -KW      Climbing 3-5 steps with a railing? 2  -KW      To walk in hospital room? 2  -KW      AM-PAC 6 Clicks Score 15  -KW         Functional Assessment    Outcome Measure Options AM-[facility] 6 Clicks Basic Mobility (PT)  -        User Key  (r) = Recorded By, (t) = Taken By, (c) = Cosigned By    Initials Name Provider Type     Maria Eugenia Greer, OTR/L Occupational Therapist    Nayan Coulter, PTA Physical Therapy Assistant    Irina Escobar, GHADA/L Occupational Therapy Assistant    Holly Ordonez, PT Physical Therapist           Time Calculation:         Time Calculation- OT     Row Name 10/10/18 1201             Time Calculation- OT    OT Start Time 1020  -BL      OT Stop Time 1047  -BL      OT Time  Calculation (min) 27 min  -BL      Total Timed Code Minutes- OT 27 minute(s)  -BL      OT Received On 10/10/18  -BL        User Key  (r) = Recorded By, (t) = Taken By, (c) = Cosigned By    Initials Name Provider Type     Irina Collazo URRUTIA/L Occupational Therapy Assistant           Therapy Suggested Charges     Code   Minutes Charges    None           Therapy Charges for Today     Code Description Service Date Service Provider Modifiers Qty    [id number] HC OT SELF CARE/MGMT/TRAIN EA 15 MIN 10/10/2018 Irina Collazo URRUTIA/L GO 1    [id number]  OT THERAPEUTIC ACT EA 15 MIN 10/10/2018 Irina Collazo URRUTIA/L GO 1          OT G-codes  OT Professional Judgement Used?: Yes  OT Functional Scales Options: AM-PAC 6 Clicks Daily Activity (OT)  Score: 12  Functional Limitation: Self care  Self Care Current Status (): At least 60 percent but less than 80 percent impaired, limited or restricted  Self Care Goal Status (): At least 40 percent but less than 60 percent impaired, limited or restricted    SHELBY BeltranA/L  10/10/2018

## 2018-10-10 NOTE — THERAPY TREATMENT NOTE
Acute Care - Physical Therapy Treatment Note  [facility]     Patient Name: Kym Fraser  : 1940  MRN: [id number]  Today's Date: 10/10/2018  Onset of Illness/Injury or Date of Surgery: 10/08/18  Date of Referral to PT: 10/08/18  Referring Physician: Dr. Gallgeo    Admit Date: 10/8/2018    Visit Dx:    ICD-10-CM ICD-9-CM   1. Altered mental status, unspecified altered mental status type R41.82 780.97   2. FTT (failure to thrive) in adult R62.7 783.7   3. Weight loss R63.4 783.21   4. Impaired functional mobility, balance, gait, and endurance Z74.09 V49.89   5. Impaired mobility and ADLs Z74.09 799.89     Patient Active Problem List   Diagnosis   • Vascular dementia without behavioral disturbance   • Type 2 diabetes mellitus with diabetic polyneuropathy, with long-term current use of insulin (CMS/[facility])   • Hypertension associated with diabetes (CMS/[facility])   • B12 deficiency   • Vitamin D deficiency   • Mixed diabetic hyperlipidemia associated with type 2 diabetes mellitus (CMS/[facility])   • Altered mental status   • Weight loss   • FTT (failure to thrive) in adult   • UTI (urinary tract infection)       Therapy Treatment          Rehabilitation Treatment Summary     Row Name 10/10/18 0925             Treatment Time/Intention    Discipline physical therapy assistant  -SUNITA      Document Type therapy note (daily note)  -SUNITA      Subjective Information complains of;fatigue  -JA      Mode of Treatment physical therapy;individual therapy  -JA      Patient/Family Observations Pt. lying supine with no family present at beginning of treatment, but arrived at end of treatment   -JA      Therapy Frequency (PT Clinical Impression) other (see comments)   5-14x/week  -JA      Patient Effort good  -JA      Existing Precautions/Restrictions fall  -JA      Recorded by [SUNITA] Nayan Dotson, PTA 10/10/18 1056      Row Name 10/10/18 0925             Vital Signs    Pre Systolic BP Rehab 154  -JA      Pre Treatment Diastolic BP 72   -JA      Post Systolic BP Rehab 144  -JA      Post Treatment Diastolic BP 66  -JA      Pretreatment Heart Rate (beats/min) 68  -JA      Posttreatment Heart Rate (beats/min) 67  -JA      Pre Patient Position Supine  -JA      Intra Patient Position Standing  -JA      Post Patient Position Sitting  -JA      Recorded by [SUNITA] Nayan Dotson, PTA 10/10/18 1056      Row Name 10/10/18 0925             Cognitive Assessment/Intervention- PT/OT    Affect/Mental Status (Cognitive) confused  -JA      Orientation Status (Cognition) oriented to;person  -JA      Follows Commands (Cognition) 50-74% accuracy;delayed response/completion;increased processing time needed;verbal cues/prompting required;repetition of directions required  -JA      Cognitive Function (Cognitive) memory deficit  -JA      Memory Deficit (Cognitive) moderate deficit;mild deficit  -JA      Recorded by [SUNITA] Nayan Dotson, PTA 10/10/18 1056      Row Name 10/10/18 0925             Safety Issues, Functional Mobility    Impairments Affecting Function (Mobility) balance;cognition;endurance/activity tolerance;strength  -JA      Recorded by [SUNITA] Nayan Dotson, PTA 10/10/18 1056      Row Name 10/10/18 0925             Bed Mobility Assessment/Treatment    Bed Mobility Assessment/Treatment supine-sit  -JA      Supine-Sit [place] (Bed Mobility) supervision  -JA      Assistive Device (Bed Mobility) bed rails;head of bed elevated  -JA      Recorded by [SUNITA] aNyan Dotson, PTA 10/10/18 1056      Row Name 10/10/18 0925             Transfer Assessment/Treatment    Transfer Assessment/Treatment sit-stand transfer;stand-sit transfer  -JA      Recorded by [SUNITA] Nayan Dotson, PTA 10/10/18 1056      Row Name 10/10/18 0925             Bed-Chair Transfer    Bed-Chair [place] (Transfers) minimum assist (75% patient effort)  -SUNITA      Assistive Device (Bed-Chair Transfers) walker, front-wheeled  -JA      Recorded by [JA] Nayan Dotson, PTA  10/10/18 1056      Row Name 10/10/18 0925             Sit-Stand Transfer    Sit-Stand [place] (Transfers) minimum assist (75% patient effort)  -SUNITA      Assistive Device (Sit-Stand Transfers) walker, front-wheeled  -JA      Recorded by [SUNITA] Nayan Dotson, PTA 10/10/18 1056      Row Name 10/10/18 0925             Stand-Sit Transfer    Stand-Sit [place] (Transfers) minimum assist (75% patient effort)  -JA      Assistive Device (Stand-Sit Transfers) walker, front-wheeled  -JA      Recorded by [JA] Nayan Dotson, PTA 10/10/18 1056      Row Name 10/10/18 0925             Lower Extremity Seated Therapeutic Exercise    Performed, Seated Lower Extremity (Therapeutic Exercise) LAQ (long arc quad), knee extension;ankle dorsiflexion/plantarflexion;hip flexion/extension  -JA      Exercise Type, Seated Lower Extremity (Therapeutic Exercise) AROM (active range of motion)  -JA      Sets/Reps Detail, Seated Lower Extremity (Therapeutic Exercise) x15  -JA      Recorded by [JA] Nayan Dotson, PTA 10/10/18 1056      Row Name 10/10/18 0925             Positioning and Restraints    Pre-Treatment Position in bed  -JA      Post Treatment Position chair  -JA      In Chair sitting;call light within reach;encouraged to call for assist;with family/caregiver  -JA      Recorded by [SUNITA] Nayan Dotson, PTA 10/10/18 1056      Row Name 10/10/18 0925             Pain Scale: Numbers Pre/Post-Treatment    Pain Scale: Numbers, Pretreatment 0/10 - no pain  -JA      Pain Scale: Numbers, Post-Treatment 0/10 - no pain  -JA      Recorded by [JA] Nayan Dotson, PTA 10/10/18 1056      Row Name 10/10/18 0925             Sensory Assessment/Intervention    Sensory General Assessment --  -JA      Recorded by [SUNITA] Nayan Dotson, PTA 10/10/18 1056      Row Name 10/10/18 0925             Vision Assessment/Intervention    Visual Impairment/Limitations --  -JA      Recorded by [SUNITA] Nayan Dotson, PTA 10/10/18 1056       Row Name                Wound 10/08/18 1415 Bilateral coccyx    Wound - Properties Group Date first assessed: 10/08/18 [EB] Time first assessed: 1415 [EB] Present On Admission : no [EB] Side: Bilateral [EB] Location: coccyx [EB] Recorded by:  [EB] Joan Bauer RN 10/08/18 1526    Row Name 10/10/18 0925             Outcome Summary/Treatment Plan (PT)    Daily Summary of Progress (PT) progress toward functional goals as expected  -JA      Plan for Continued Treatment (PT) short distance gt. as pt. able   -JA      Anticipated Discharge Disposition (PT) [facility] nursing facility  -JA      Recorded by [JA] Nayan Dotson, PTA 10/10/18 1056        User Key  (r) = Recorded By, (t) = Taken By, (c) = Cosigned By    Initials Name Effective Dates Discipline    EB Joan Baeur RN 10/17/16 -  Nurse    Nayan Coulter, PTA 03/07/18 -  PT          Wound 10/08/18 1415 Bilateral coccyx (Active)   Dressing Appearance open to air 10/9/2018  8:15 PM   Base pink 10/9/2018  8:15 PM               PT Rehab Goals     Row Name 10/10/18 0925             Bed Mobility Goal 1 (PT)    Activity/Assistive Device (Bed Mobility Goal 1, PT) rolling to left;rolling to right;sit to supine;supine to sit  -JA      [place] Level/Cues Needed (Bed Mobility Goal 1, PT) supervision required  -JA      Time Frame (Bed Mobility Goal 1, PT) 2 days  -JA      Progress/Outcomes (Bed Mobility Goal 1, PT) goal not met  -JA         Transfer Goal 1 (PT)    Activity/Assistive Device (Transfer Goal 1, PT) sit-to-stand/stand-to-sit;bed-to-chair/chair-to-bed;walker, rolling  -JA      [place] Level/Cues Needed (Transfer Goal 1, PT) supervision required  -JA      Time Frame (Transfer Goal 1, PT) 5 days  -JA      Progress/Outcome (Transfer Goal 1, PT) goal not met  -JA         Gait Training Goal 1 (PT)    Activity/Assistive Device (Gait Training Goal 1, PT) gait (walking locomotion);assistive device use;decrease fall risk;improve balance and  speed;increase endurance/gait distance;walker, rolling  -JA      [place] Level (Gait Training Goal 1, PT) supervision required  -JA      Distance (Gait Goal 1, PT) 25 ft or more  -JA      Time Frame (Gait Training Goal 1, PT) 1 week  -JA      Progress/Outcome (Gait Training Goal 1, PT) goal not met  -JA         Strength Goal 1 (PT)    Strength Goal 1 (PT) Pt will tolerate 15 reps of at least 3 different LE exercises  -JA      Time Frame (Strength Goal 1, PT) 5 days  -JA      Progress/Outcome (Strength Goal 1, PT) goal met  -JA        User Key  (r) = Recorded By, (t) = Taken By, (c) = Cosigned By    Initials Name Provider Type Discipline    Nayan Coulter, PTA Physical Therapy Assistant PT          Physical Therapy Education     Title: PT OT SLP Therapies (Active)     Topic: Physical Therapy (Active)     Point: Mobility training (Done)    Learning Progress Summary     Learner Status Readiness Method Response Comment Documented by    Patient Done Acceptance E VU Role of PT, POC, use of gait belt KW 10/09/18 1441          Point: Home exercise program (Done)    Learning Progress Summary     Learner Status Readiness Method Response Comment Documented by    Patient Done Acceptance E VU Role of PT, POC, use of gait belt KW 10/09/18 1441          Point: Precautions (Done)    Learning Progress Summary     Learner Status Readiness Method Response Comment Documented by    Patient Done Acceptance E VU Role of PT, POC, use of gait belt KW 10/09/18 1441                      User Key     Initials Effective Dates Name Provider Type Discipline     07/23/18 -  Holly Cross, PT Physical Therapist PT                    PT Recommendation and Plan  Anticipated Discharge Disposition (PT): skilled nursing facility  Therapy Frequency (PT Clinical Impression): other (see comments) (5-14x/week)  Outcome Summary/Treatment Plan (PT)  Daily Summary of Progress (PT): progress toward functional goals as expected  Plan for Continued  Treatment (PT): short distance gt. as pt. able   Anticipated Discharge Disposition (PT): skilled nursing facility  Plan of Care Reviewed With: patient  Progress: improving  Outcome Summary: Pt. able to transfer sup-sit SBA with HOB up & bedrails. Pt. transferred bed-recliner with RW Benigno & able to perform x15 reps LE therex. Attempt short distance gt.           Outcome Measures     Row Name 10/10/18 0925 10/09/18 0923 10/09/18 0922       How much help from another person do you currently need...    Turning from your back to your side while in flat bed without using bedrails? 3  -JA  -- 3  -KW    Moving from lying on back to sitting on the side of a flat bed without bedrails? 3  -JA  -- 3  -KW    Moving to and from a bed to a chair (including a wheelchair)? 3  -JA  -- 2  -KW    Standing up from a chair using your arms (e.g., wheelchair, bedside chair)? 3  -JA  -- 3  -KW    Climbing 3-5 steps with a railing? 2  -JA  -- 2  -KW    To walk in hospital room? 2  -JA  -- 2  -KW    AM-PAC 6 Clicks Score 16  -JA  -- 15  -KW       How much help from another is currently needed...    Putting on and taking off regular lower body clothing?  -- 2  -BH  --    Bathing (including washing, rinsing, and drying)  -- 2  -BH  --    Toileting (which includes using toilet bed pan or urinal)  -- 1  -BH  --    Putting on and taking off regular upper body clothing  -- 2  -BH  --    Taking care of personal grooming (such as brushing teeth)  -- 2  -BH  --    Eating meals  -- 3  -BH  --    Score  -- 12  -BH  --       Functional Assessment    Outcome Measure Options AM-PAC 6 Clicks Basic Mobility (PT)  - AM-PAC 6 Clicks Daily Activity (OT)  - AM-PAC 6 Clicks Basic Mobility (PT)  -      User Key  (r) = Recorded By, (t) = Taken By, (c) = Cosigned By    Initials Name Provider Type    Maria Eugenia Romero, OTR/L Occupational Therapist    Nayan Coulter, PTA Physical Therapy Assistant    Holly Ordonez, PT Physical Therapist            Time Calculation:         PT Charges     Row Name 10/10/18 1058             Time Calculation    Start Time 0925  -      Stop Time 0950  -      Time Calculation (min) 25 min  -         Time Calculation- PT    Total Timed Code Minutes- PT 25 minute(s)  -         Timed Charges    10502 - PT Therapeutic Exercise Minutes 15  -JA      82768 - PT Therapeutic Activity Minutes 10  -JA        User Key  (r) = Recorded By, (t) = Taken By, (c) = Cosigned By    Initials Name Provider Type    Nayan Coulter, PTA Physical Therapy Assistant        Therapy Suggested Charges     Code   Minutes Charges    90173 (CPT®) Hc Pt Neuromusc Re Education Ea 15 Min      13007 (CPT®) Hc Pt Ther Proc Ea 15 Min 15 1    50199 (CPT®) Hc Gait Training Ea 15 Min      39955 (CPT®) Hc Pt Therapeutic Act Ea 15 Min 10 1    75097 (CPT®) Hc Pt Manual Therapy Ea 15 Min      83242 (CPT®) Hc Pt Iontophoresis Ea 15 Min      41603 (CPT®) Hc Pt Elec Stim Ea-Per 15 Min      28414 (CPT®) Hc Pt Ultrasound Ea 15 Min      05941 (CPT®) Hc Pt Self Care/Mgmt/Train Ea 15 Min      88723 (CPT®) Hc Pt Prosthetic (S) Train Initial Encounter, Each 15 Min      38877 (CPT®) Hc Pt Orthotic(S)/Prosthetic(S) Encounter, Each 15 Min      58750 (CPT®) Hc Orthotic(S) Mgmt/Train Initial Encounter, Each 15min      Total  25 2        Therapy Charges for Today     Code Description Service Date Service Provider Modifiers Qty    66429044176 HC PT THER PROC EA 15 MIN 10/10/2018 Nayan Dotson, PTA GP 1    97737765594 HC PT THERAPEUTIC ACT EA 15 MIN 10/10/2018 Nayan Dotson, PTA GP 1          PT G-Codes  PT Professional Judgement Used?: Yes  Outcome Measure Options: AM-PAC 6 Clicks Basic Mobility (PT)  AM-PAC 6 Clicks Score: 16  Score: 12  Functional Limitation: Mobility: Walking and moving around  Mobility: Walking and Moving Around Current Status (): At least 40 percent but less than 60 percent impaired, limited or restricted  Mobility: Walking and Moving  Around Goal Status (): At least 1 percent but less than 20 percent impaired, limited or restricted    Nayan Dotson, PTA  10/10/2018

## 2018-10-10 NOTE — PROGRESS NOTES
[facility]  INPATIENT PROGRESS NOTE    Length of Stay: 0  Date of Admission: 10/8/2018  Primary Care Physician: Brendan Longoria MD    Subjective   Chief Complaint: Altered mental status and weakness with fall    HPI:  The patient is 78-year-old white female with history of dementia, depression, anxiety, diabetes mellitus, GERD, hypertension and recent urinary tract infection who was admitted from the nursing facility with history of having fallen down and being more confused with altered mental status compared to her baseline.     On evaluation this afternoon patient has no complaints.  She denies dysuria or hematuria.  She denies nausea or vomiting.  She denies headaches or lightheadedness.  She denies chest pain, palpitations, shortness of breath.  She denies cough or wheeze.  She denies abdominal pain.  She denies fevers or myalgias.    Review of Systems  Other review of systems negative  Objective    Temp:  [96.8 °F (36 °C)-97.3 °F (36.3 °C)] 97.3 °F (36.3 °C)  Heart Rate:  [61-72] 61  Resp:  [18] 18  BP: ()/(40-75) 118/55    Physical Exam   Constitutional: She appears well-developed and well-nourished. No distress.   HENT:   Head: Normocephalic.   Mouth/Throat: Oropharynx is clear and moist.   Eyes: Pupils are equal, round, and reactive to light. Conjunctivae and EOM are normal.   Neck: Normal range of motion. Neck supple. No JVD present. No tracheal deviation present. No thyromegaly present.   Cardiovascular: Normal rate, regular rhythm, normal heart sounds and intact distal pulses.  Exam reveals no gallop and no friction rub.    No murmur heard.  Pulmonary/Chest: Effort normal and breath sounds normal. No stridor. No respiratory distress. She has no wheezes. She has no rales. She exhibits no tenderness.   Abdominal: Soft. Bowel sounds are normal. She exhibits no distension and no mass. There is no tenderness. There is no rebound and no  guarding. No hernia.   Musculoskeletal: Normal range of motion. She exhibits no edema, tenderness or deformity.   Lymphadenopathy:     She has no cervical adenopathy.   Neurological: She is alert. No cranial nerve deficit or sensory deficit. She exhibits normal muscle tone. Coordination normal.   Oriented to person.  Not oriented to time or place.  Normal power in all limbs with no focal deficits.   Skin: Skin is warm and dry. No rash noted. She is not diaphoretic. No erythema. No pallor.   Psychiatric: She has a normal mood and affect. Her behavior is normal. Judgment and thought content normal.       Medication Review:    Current Facility-Administered Medications:   •  acetaminophen (TYLENOL) tablet 650 mg, 650 mg, Oral, Q4H PRN, Alfonso Gallego MD  •  amLODIPine (NORVASC) tablet 5 mg, 5 mg, Oral, Daily, Alfonso Gallego MD, 5 mg at 10/09/18 0854  •  aspirin chewable tablet 81 mg, 81 mg, Oral, Daily, Alfonso Gallego MD, 81 mg at 10/09/18 0856  •  atorvastatin (LIPITOR) tablet 10 mg, 10 mg, Oral, Daily, Alfonso Gallego MD, 10 mg at 10/09/18 0854  •  cefTRIAXone (ROCEPHIN) 1 g/100 mL 0.9% NS (MBP), 1 g, Intravenous, Q24H, Alfonso Gallego MD, Stopped at 10/09/18 1959  •  dextrose (D50W) 25 g/ 50mL Intravenous Solution 25 g, 25 g, Intravenous, Q15 Min PRN, Alfonso Gallego MD  •  dextrose (GLUTOSE) oral gel 15 g, 15 g, Oral, Q15 Min PRN, Alfonso Gallego MD  •  donepezil (ARICEPT) tablet 10 mg, 10 mg, Oral, Nightly, Alfonso Gallego MD, 10 mg at 10/09/18 2133  •  dorzolamide-timolol (COSOPT) ophthalmic solution 1 drop, 1 drop, Both Eyes, BID, Alfonso Gallego MD, 1 drop at 10/09/18 2133  •  famotidine (PEPCID) tablet 20 mg, 20 mg, Oral, BID, Alfonso Gallego MD, 20 mg at 10/09/18 2133  •  FLUoxetine (PROzac) capsule 20 mg, 20 mg, Oral, Daily, Alfonso Gallego MD, 20 mg at 10/09/18 0854  •  glucagon (human recombinant) (GLUCAGEN DIAGNOSTIC) injection 1 mg, 1 mg, Subcutaneous, PRN, Alfonso Gallego MD  •  Influenza Vac Subunit Quad  (FLUCELVAX) injection 0.5 mL, 0.5 mL, Intramuscular, During Hospitalization, Alfonso Gallego MD  •  insulin aspart (novoLOG) injection 0-7 Units, 0-7 Units, Subcutaneous, 4x Daily AC & at Bedtime, Alfonso Gallego MD  •  insulin detemir (LEVEMIR) injection 15 Units, 15 Units, Subcutaneous, Q12H, Alfonso Gallego MD, 15 Units at 10/08/18 2032  •  lisinopril (PRINIVIL,ZESTRIL) tablet 20 mg, 20 mg, Oral, Daily, Alfonso Gallego MD, 20 mg at 10/09/18 0854  •  LORazepam (ATIVAN) tablet 1 mg, 1 mg, Oral, Q6H PRN, Alfonso Gallego MD, 1 mg at 10/08/18 1617  •  memantine (NAMENDA) tablet 10 mg, 10 mg, Oral, Q12H, Alfonso Gallego MD, 10 mg at 10/09/18 2133  •  metFORMIN (GLUCOPHAGE) tablet 500 mg, 500 mg, Oral, BID With Meals, Alfonso Gallego MD, 500 mg at 10/09/18 1820  •  mirtazapine (REMERON) tablet 15 mg, 15 mg, Oral, Nightly, Alfonso Gallego MD, 15 mg at 10/09/18 2133  •  montelukast (SINGULAIR) tablet 10 mg, 10 mg, Oral, Nightly, Alfonso Gallego MD, 10 mg at 10/09/18 2133  •  ondansetron (ZOFRAN) tablet 4 mg, 4 mg, Oral, Q6H PRN **OR** ondansetron ODT (ZOFRAN-ODT) disintegrating tablet 4 mg, 4 mg, Oral, Q6H PRN **OR** ondansetron (ZOFRAN) injection 4 mg, 4 mg, Intravenous, Q6H PRN, Alfonso Gallego MD  •  ondansetron ODT (ZOFRAN-ODT) disintegrating tablet 4 mg, 4 mg, Oral, Q6H PRN, Alfonso Gallego MD  •  pneumococcal polysaccharide 23-valent (PNEUMOVAX-23) vaccine 0.5 mL, 0.5 mL, Intramuscular, During Hospitalization, Alfonso Gallego MD  •  Insert peripheral IV, , , Once **AND** sodium chloride 0.9 % flush 10 mL, 10 mL, Intravenous, PRN, Butch Daigle, DO, 10 mL at 10/08/18 1150  •  sodium chloride 0.9 % flush 3 mL, 3 mL, Intravenous, Q12H, Alfonso Gallego MD, 3 mL at 10/08/18 2035  •  sodium chloride 0.9 % flush 3 mL, 3 mL, Intravenous, Q12H, Alfonso Gallego MD, 3 mL at 10/08/18 2035  •  sodium chloride 0.9 % flush 3-10 mL, 3-10 mL, Intravenous, PRN, Alfonso Gallego MD  •  sodium chloride 0.9 % flush 3-10 mL, 3-10 mL, Intravenous,  PRN, Alfonso Gallego MD  •  sodium chloride 0.9 % infusion, 100 mL/hr, Intravenous, Continuous, Alfonso Gallego MD, Last Rate: 100 mL/hr at 10/09/18 2205, 100 mL/hr at 10/09/18 2205    Results Review:  I have reviewed the labs, radiology results, and diagnostic studies.    Laboratory Data:     Results from last 7 days  Lab Units 10/09/18  0348 10/08/18  1149   SODIUM mmol/L 139 141   POTASSIUM mmol/L 3.8 3.8   CHLORIDE mmol/L 106 104   CO2 mmol/L 23.0 24.0   BUN mg/dL 20 15   CREATININE mg/dL 0.80 0.66   GLUCOSE mg/dL 139* 98   CALCIUM mg/dL 9.0 9.5   BILIRUBIN mg/dL  --  0.5   ALK PHOS U/L  --  69   ALT (SGPT) U/L  --  38   AST (SGOT) U/L  --  32   ANION GAP mmol/L 10.0 13.0     Estimated Creatinine Clearance: 58.7 mL/min (by C-G formula based on SCr of 0.8 mg/dL).    Results from last 7 days  Lab Units 10/08/18  1149   MAGNESIUM mg/dL 1.9           Results from last 7 days  Lab Units 10/09/18  0348 10/08/18  1149   WBC 10*3/mm3 6.77 10.41*   HEMOGLOBIN g/dL 12.1 13.6   HEMATOCRIT % 36.0 40.1   PLATELETS 10*3/mm3 250 248           Culture Data:   No results found for: BLOODCX  Urine Culture   Date Value Ref Range Status   10/08/2018 No growth at 24 hours  Final     No results found for: RESPCX  No results found for: WOUNDCX  No results found for: STOOLCX  No components found for: BODYFLD    Radiology Data:   Imaging Results (last 24 hours)     ** No results found for the last 24 hours. **          I have reviewed the patient's current medications.     Assessment/Plan     Hospital Problem List:  Principal Problem:    Altered mental status  Active Problems:    Vascular dementia without behavioral disturbance    Type 2 diabetes mellitus with diabetic polyneuropathy, with long-term current use of insulin (CMS/HCC)    Hypertension associated with diabetes (CMS/[facility])    B12 deficiency    Mixed diabetic hyperlipidemia associated with type 2 diabetes mellitus (CMS/HCC)    Weight loss    FTT (failure to thrive) in adult    UTI  (urinary tract infection)    The patient was admitted to hospital for IV fluids for hydration and IV antibiotics for UTI.   - Her WBC count has a motorized to 6,700 this morning.  -Serial troponins and EKG were negative for any acute coronary syndrome  - CT scan of patient's head showed no acute intracranial lesion.  Patient was to have MRI of her brain however she could not stay for the procedure and attempted to call out of the magnetic.  - PT and OT eval.   recommend skilled nursing facility   - Is unlikely that patient is having a stroke.  Her current symptoms explained by urinary tract infection.  We will discuss with patient's daughter and updated her.     Discharge Planning: I expect patient to be in the hospital for the next 1-2 days.    Jani Hampton MD   10/09/18   11:15 PM

## 2018-10-10 NOTE — DISCHARGE SUMMARY
[facility]  DISCHARGE SUMMARY       Date of Admission: 10/8/2018  Date of Discharge:  10/10/2018  Primary Care Physician: Brendan Longoria MD    Presenting Problem/History of Present Illness:  Weight loss [R63.4]  FTT (failure to thrive) in adult [R62.7]  Altered mental status, unspecified altered mental status type [R41.82]     Final Discharge Diagnoses:  Active Hospital Problems    Diagnosis   • **Altered mental status   • Weight loss   • FTT (failure to thrive) in adult   • UTI (urinary tract infection)   • Vascular dementia without behavioral disturbance   • Type 2 diabetes mellitus with diabetic polyneuropathy, with long-term current use of insulin (CMS/[facility])   • Hypertension associated with diabetes (CMS/[facility])   • Mixed diabetic hyperlipidemia associated with type 2 diabetes mellitus (CMS/[facility])   • B12 deficiency       Consults:   Consults     Date and Time Order Name Status Description    10/8/2018 1249 Hospitalist (on-call MD unless specified)            Procedures Performed:  None                Pertinent Test Results:  Results for SHAUN MCPHERSON (MRN [id number]) as of 10/11/2018 00:43   Ref. Range 10/8/2018 08:33   Color, UA Latest Ref Range: Yellow, Straw, Dark Yellow, Nara  Yellow   Appearance, UA Latest Ref Range: Clear  Clear   Specific [place], UA Latest Ref Range: 1.003 - 1.030  1.020   pH, UA Latest Ref Range: 5.0 - 9.0  5.5   Glucose, UA Latest Ref Range: Negative  Negative   Ketones, UA Latest Ref Range: Negative  Negative   Blood, UA Latest Ref Range: Negative  Negative   Nitrite, UA Latest Ref Range: Negative  Negative   Leuk Esterase, UA Latest Ref Range: Negative  Trace (A)   Protein, UA Latest Ref Range: Negative  Negative   Bilirubin, UA Latest Ref Range: Negative  Negative   Urobilinogen, UA Latest Ref Range: 0.2 - 1.0 E.U./dL  0.2 E.U./dL   RBC, UA Latest Ref Range: None Seen /HPF 0-2 (A)   WBC, UA Latest Ref Range: None Seen,  "0-2, 3-5 /HPF 6-12 (A)   Bacteria, UA Latest Ref Range: None Seen /HPF Trace (A)   Squamous Epithelial Cells, UA Latest Ref Range: None Seen, 0-2 /HPF 0-2   Hyaline Casts, UA Latest Ref Range: None Seen /LPF 3-6   Methodology: Unknown Automated Microscopy         Chief Complaint on Day of Discharge: Generalized weakness    Hospital Course:  The patient is a 78 y.o. female with history of dementia, depression, anxiety, diabetes mellitus, GERD, hypertension and recent urinary tract infection who was admitted from the nursing facility with history of having fallen down and being more confused with altered mental status compared to her baseline.     The patient had been on antibiotics for UTI and her urinalysis still showed some white cells and bacteria.  Her evaluation the ER including CT of head cervical thoracic and lumbar spine which did not show any acute process. The patient did state that she's had some soreness in her chest.  She says she's felt sick and very weak and has had some nausea and vomiting.  She did complain of some burning with urination and some dysuria.  The patient was evaluated in the ER and physical exam was non focal. She was admitted for further evaluation of her altered mental status and UTI with history of falling. She had an EKG showing normal sinus rhythm and negative serial troponins. She was started on IV ceftriaxone. Her mental status improved back to her baseline level of functioning given her dementia. She was seen by physical therapy and occupational therapy. She was evaluated by dietician and her diet was supplemented by glucerna. She was discharged back to the nursing home with adjustment to her insulin regimen given her poor appetite and oral intake.    Condition on Discharge:  Stable    Physical Exam on Discharge:  /56 (BP Location: Right arm, Patient Position: Lying)   Pulse 77   Temp 99.1 °F (37.3 °C) (Oral)   Resp 18   Ht 165.1 cm (65\")   Wt 63.1 kg (139 lb 3.2 oz)  "  SpO2 95%   BMI 23.16 kg/m²      Physical Exam  Constitutional: She appears well-developed and well-nourished. No distress.   HENT:   Head: Normocephalic.   Mouth/Throat: Oropharynx is clear and moist.   Eyes: Pupils are equal, round, and reactive to light. Conjunctivae and EOM are normal.   Neck: Normal range of motion. Neck supple. No JVD present. No tracheal deviation present. No thyromegaly present.   Cardiovascular: Normal rate, regular rhythm, normal heart sounds and intact distal pulses.  Exam reveals no gallop and no friction rub.    No murmur heard.  Pulmonary/Chest: Effort normal and breath sounds normal. No stridor. No respiratory distress. She has no wheezes. She has no rales. She exhibits no tenderness.   Abdominal: Soft. Bowel sounds are normal. She exhibits no distension and no mass. There is no tenderness. There is no rebound and no guarding. No hernia.   Musculoskeletal: Normal range of motion. She exhibits no edema, tenderness or deformity.   Lymphadenopathy:     She has no cervical adenopathy.   Neurological: She is more alert today. No cranial nerve deficit or sensory deficit. She exhibits normal muscle tone. Coordination normal.   Oriented to person.  Not oriented to time or place.  Normal power in all limbs with no focal deficits.   Skin: Skin is warm and dry. No rash noted. She is not diaphoretic. No erythema. No pallor.   Psychiatric: She has a normal mood and affect. Her behavior is normal. Judgment and thought content normal.     Discharge Disposition: Skilled nursing facility      Discharge Medications:     Discharge Medications      New Medications      Instructions Start Date   cefuroxime 500 MG tablet  Commonly known as:  CEFTIN   500 mg, Oral, 2 Times Daily         Changes to Medications      Instructions Start Date   insulin aspart 100 UNIT/ML injection  Commonly known as:  novoLOG  What changed:  · how much to take  · when to take this  · additional instructions   0-7 Units,  Subcutaneous, 4 Times Daily Before Meals & Nightly      lisinopril 20 MG tablet  Commonly known as:  PRINIVIL,ZESTRIL  What changed:  Another medication with the same name was removed. Continue taking this medication, and follow the directions you see here.   20 mg, Oral, Daily         Continue These Medications      Instructions Start Date   amLODIPine 5 MG tablet  Commonly known as:  NORVASC   5 mg, Oral, Daily      aspirin 81 MG chewable tablet   81 mg, Oral, Daily      brimonidine-timolol 0.2-0.5 % ophthalmic solution  Commonly known as:  COMBIGAN   Every 12 Hours      donepezil 10 MG tablet  Commonly known as:  ARICEPT   10 mg, Oral, Nightly      Dulaglutide 0.75 MG/0.5ML solution pen-injector  Notes to patient:  Resume home schedule    0.75 mg, Subcutaneous, Weekly, Patient takes on Wednesdays       famotidine 20 MG tablet  Commonly known as:  PEPCID   20 mg, Oral, 2 Times Daily      fish oil 1000 MG capsule capsule   Oral, Daily With Breakfast      FLUoxetine 20 MG capsule  Commonly known as:  PROzac   20 mg, Oral, Daily      glucagon 1 MG injection  Commonly known as:  GLUCAGEN   Intravenous, Once      memantine 10 MG tablet  Commonly known as:  NAMENDA   10 mg, Oral, 2 Times Daily      metFORMIN 500 MG tablet  Commonly known as:  GLUCOPHAGE   500 mg, Oral, 2 Times Daily With Meals      mirtazapine 15 MG tablet  Commonly known as:  REMERON   15 mg, Oral, Nightly      montelukast 10 MG tablet  Commonly known as:  SINGULAIR   10 mg, Oral, Nightly      ondansetron ODT 4 MG disintegrating tablet  Commonly known as:  ZOFRAN-ODT   4 mg, Oral, Every 6 Hours PRN      ZOCOR 20 MG tablet  Generic drug:  simvastatin   20 mg, Oral, Nightly         Stop These Medications    cephalexin 500 MG capsule  Commonly known as:  KEFLEX     insulin glargine 100 UNIT/ML injection  Commonly known as:  LANTUS            Discharge Diet:  Consistent carbohydrate diet    Activity at Discharge: Self-limited activity as  tolerated    Discharge Care Plan/Instructions:  1. Follow up with your primary care provider within 1 week of discharge.     Follow-up Appointments:   No future appointments.    Test Results Pending at Discharge: None    Jani Hampton MD  10/10/18  1:34 PM    Time: 38 minutes

## 2018-10-10 NOTE — PLAN OF CARE
Problem: Patient Care Overview  Goal: Plan of Care Review  Outcome: Ongoing (interventions implemented as appropriate)   10/10/18 1321   Coping/Psychosocial   Plan of Care Reviewed With patient   Plan of Care Review   Progress no change   OTHER   Outcome Summary Pt participated fair this date with moderate vc's for sequencing task however was able to transfer with min assist for sit to stand, chair to bed, and required supervision for sit to supine into bed. Pt required max assist for pericare this date with incontinent episode this date. No goals met and making gradual progress towards unmet goals. Pt will remain appropriate for skilled OT to increase ADL independence and overall safety.

## 2018-10-10 NOTE — PLAN OF CARE
Problem: Patient Care Overview  Goal: Plan of Care Review  Outcome: Ongoing (interventions implemented as appropriate)   10/10/18 0982   Coping/Psychosocial   Plan of Care Reviewed With patient   Plan of Care Review   Progress improving   OTHER   Outcome Summary Pt. able to transfer sup-sit SBA with HOB up & bedrails. Pt. transferred bed-recliner with RW Benigno & able to perform x15 reps LE therex. Attempt short distance gt.      Goal: Discharge Needs Assessment   10/08/18 1413 10/08/18 1900   Disability   Equipment Currently Used at Home bath bench;wheelchair --    Discharge Needs Assessment   Patient/Family Anticipates Transition to --  inpatient rehabilitation facility;long term care facility   Patient/Family Anticipated Services at Transition --     Transportation Concerns --  other (see comments)  (ambulance)   Transportation Anticipated --  health plan transportation  (EMS)

## 2018-10-11 NOTE — THERAPY DISCHARGE NOTE
Acute Care - Occupational Therapy Discharge Summary  [facility]     Patient Name: Kym Fraser  : 1940  MRN: [id number]    Today's Date: 10/11/2018  Onset of Illness/Injury or Date of Surgery: 10/08/18    Date of Referral to OT: 10/08/18  Referring Physician: Dr. Gallego      Admit Date: 10/8/2018        OT Recommendation and Plan    Visit Dx:    ICD-10-CM ICD-9-CM   1. Altered mental status, unspecified altered mental status type R41.82 780.97   2. FTT (failure to thrive) in adult R62.7 783.7   3. Weight loss R63.4 783.21   4. Impaired functional mobility, balance, gait, and endurance Z74.09 V49.89   5. Impaired mobility and ADLs Z74.09 799.89                     OT Rehab Goals     Row Name 10/11/18 1806             Transfer Goal 1 (OT)    Activity/Assistive Device (Transfer Goal 1, OT) toilet   BSC as needed  -AS      [place] Level/Cues Needed (Transfer Goal 1, OT) moderate assist (50-74% patient effort)  -AS      Time Frame (Transfer Goal 1, OT) long term goal (LTG);by discharge  -AS      Progress/Outcome (Transfer Goal 1, OT) goal not met  -AS         Bathing Goal 1 (OT)    Activity/Assistive Device (Bathing Goal 1, OT) upper body bathing  -AS      [place] Level/Cues Needed (Bathing Goal 1, OT) minimum assist (75% or more patient effort)  -AS      Time Frame (Bathing Goal 1, OT) long term goal (LTG);by discharge  -AS      Progress/Outcomes (Bathing Goal 1, OT) goal not met  -AS         Dressing Goal 1 (OT)    Activity/Assistive Device (Dressing Goal 1, OT) dressing skills, all  -AS      [place]/Cues Needed (Dressing Goal 1, OT) minimum assist (75% or more patient effort)  -AS      Time Frame (Dressing Goal 1, OT) long term goal (LTG);by discharge  -AS      Progress/Outcome (Dressing Goal 1, OT) goal not met  -AS         Grooming Goal 1 (OT)    Activity/Device (Grooming Goal 1, OT) grooming skills, all  -AS      [place] (Grooming Goal 1, OT) set-up required;verbal cues  required;standby assist  -AS      Time Frame (Grooming Goal 1, OT) long term goal (LTG);by discharge  -AS      Progress/Outcome (Grooming Goal 1, OT) goal not met  -AS        User Key  (r) = Recorded By, (t) = Taken By, (c) = Cosigned By    Initials Name Provider Type Discipline    AS Krysta Raygoza, OT Occupational Therapist OT                Outcome Measures     Row Name 10/10/18 1020 10/10/18 0925 10/09/18 0923       How much help from another person do you currently need...    Turning from your back to your side while in flat bed without using bedrails?  -- 3  -JA  --    Moving from lying on back to sitting on the side of a flat bed without bedrails?  -- 3  -JA  --    Moving to and from a bed to a chair (including a wheelchair)?  -- 3  -JA  --    Standing up from a chair using your arms (e.g., wheelchair, bedside chair)?  -- 3  -JA  --    Climbing 3-5 steps with a railing?  -- 2  -JA  --    To walk in hospital room?  -- 2  -JA  --    AM-PAC 6 Clicks Score  -- 16  -JA  --       How much help from another is currently needed...    Putting on and taking off regular lower body clothing? 2  -BL  -- 2  -BH    Bathing (including washing, rinsing, and drying) 2  -BL  -- 2  -BH    Toileting (which includes using toilet bed pan or urinal) 2  -BL  -- 1  -BH    Putting on and taking off regular upper body clothing 2  -BL  -- 2  -BH    Taking care of personal grooming (such as brushing teeth) 2  -BL  -- 2  -BH    Eating meals 3  -BL  -- 3  -BH    Score 13  -BL  -- 12  -BH       Functional Assessment    Outcome Measure Options AM-PAC 6 Clicks Daily Activity (OT)  -BL AM-PAC 6 Clicks Basic Mobility (PT)  - AM-PAC 6 Clicks Daily Activity (OT)  -    Row Name 10/09/18 0922             How much help from another person do you currently need...    Turning from your back to your side while in flat bed without using bedrails? 3  -KW      Moving from lying on back to sitting on the side of a flat bed without bedrails? 3  -KW       Moving to and from a bed to a chair (including a wheelchair)? 2  -KW      Standing up from a chair using your arms (e.g., wheelchair, bedside chair)? 3  -KW      Climbing 3-5 steps with a railing? 2  -KW      To walk in hospital room? 2  -KW      AM-PAC 6 Clicks Score 15  -KW         Functional Assessment    Outcome Measure Options AM-PAC 6 Clicks Basic Mobility (PT)  -KW        User Key  (r) = Recorded By, (t) = Taken By, (c) = Cosigned By    Initials Name Provider Type    Maria Eugenia Romero, OTR/L Occupational Therapist    Nayan Coulter, PTA Physical Therapy Assistant    BL Irina Collazo, URRUTIA/L Occupational Therapy Assistant    KW Holly Cross, PT Physical Therapist          Therapy Suggested Charges     Code   Minutes Charges    None                 OT Discharge Summary  Anticipated Discharge Disposition (OT): skilled nursing facility  Reason for Discharge: Per MD order, Discharge from facility  Outcomes Achieved: Refer to plan of care for updates on goals achieved  Discharge Destination: SNF      Krysta Raygoza, ADRI  10/11/2018

## 2018-10-12 NOTE — THERAPY DISCHARGE NOTE
Acute Care - Physical Therapy Discharge Summary  [facility]       Patient Name: Kym Fraser  : 1940  MRN: [id number]    Today's Date: 10/12/2018  Onset of Illness/Injury or Date of Surgery: 10/08/18    Date of Referral to PT: 10/08/18  Referring Physician: Dr. Gallego      Admit Date: 10/8/2018      PT Recommendation and Plan    Visit Dx:    ICD-10-CM ICD-9-CM   1. Altered mental status, unspecified altered mental status type R41.82 780.97   2. FTT (failure to thrive) in adult R62.7 783.7   3. Weight loss R63.4 783.21   4. Impaired functional mobility, balance, gait, and endurance Z74.09 V49.89   5. Impaired mobility and ADLs Z74.09 799.89             Outcome Measures     Row Name 10/10/18 1020 10/10/18 0925          How much help from another person do you currently need...    Turning from your back to your side while in flat bed without using bedrails?  -- 3  -JA     Moving from lying on back to sitting on the side of a flat bed without bedrails?  -- 3  -JA     Moving to and from a bed to a chair (including a wheelchair)?  -- 3  -JA     Standing up from a chair using your arms (e.g., wheelchair, bedside chair)?  -- 3  -JA     Climbing 3-5 steps with a railing?  -- 2  -JA     To walk in hospital room?  -- 2  -JA     AM-PAC 6 Clicks Score  -- 16  -JA        How much help from another is currently needed...    Putting on and taking off regular lower body clothing? 2  -BL  --     Bathing (including washing, rinsing, and drying) 2  -BL  --     Toileting (which includes using toilet bed pan or urinal) 2  -BL  --     Putting on and taking off regular upper body clothing 2  -BL  --     Taking care of personal grooming (such as brushing teeth) 2  -BL  --     Eating meals 3  -BL  --     Score 13  -BL  --        Functional Assessment    Outcome Measure Options AM-PAC 6 Clicks Daily Activity (OT)  -BL AM-PAC 6 Clicks Basic Mobility (PT)  -JA       User Key  (r) = Recorded By, (t) = Taken By, (c) = Cosigned By     Initials Name Provider Type    Nayan Coulter, PTA Physical Therapy Assistant    BL Irina Collazo, URRUTIA/L Occupational Therapy Assistant            Therapy Suggested Charges     Code   Minutes Charges    16666 (CPT®) Hc Pt Neuromusc Re Education Ea 15 Min      52352 (CPT®) Hc Pt Ther Proc Ea 15 Min 15 1    86164 (CPT®) Hc Gait Training Ea 15 Min      41137 (CPT®) Hc Pt Therapeutic Act Ea 15 Min 10 1    54181 (CPT®) Hc Pt Manual Therapy Ea 15 Min      72387 (CPT®) Hc Pt Iontophoresis Ea 15 Min      19675 (CPT®) Hc Pt Elec Stim Ea-Per 15 Min      19781 (CPT®) Hc Pt Ultrasound Ea 15 Min      72297 (CPT®) Hc Pt Self Care/Mgmt/Train Ea 15 Min      96955 (CPT®) Hc Pt Prosthetic (S) Train Initial Encounter, Each 15 Min      90358 (CPT®) Hc Pt Orthotic(S)/Prosthetic(S) Encounter, Each 15 Min      02661 (CPT®) Hc Orthotic(S) Mgmt/Train Initial Encounter, Each 15min      Total  25 2                  PT Discharge Summary  Anticipated Discharge Disposition (PT): skilled nursing facility  Reason for Discharge: Discharge from facility, Per MD order  Outcomes Achieved: Patient able to partially acheive established goals, Refer to plan of care for updates on goals achieved  Discharge Destination: SNF      Nicole Boyer, PT   10/12/2018